# Patient Record
Sex: MALE | Race: WHITE | Employment: FULL TIME
[De-identification: names, ages, dates, MRNs, and addresses within clinical notes are randomized per-mention and may not be internally consistent; named-entity substitution may affect disease eponyms.]

---

## 2017-01-17 ENCOUNTER — OFFICE VISIT (OUTPATIENT)
Dept: FAMILY MEDICINE CLINIC | Facility: CLINIC | Age: 22
End: 2017-01-17

## 2017-01-17 VITALS
BODY MASS INDEX: 33.64 KG/M2 | DIASTOLIC BLOOD PRESSURE: 78 MMHG | HEART RATE: 89 BPM | OXYGEN SATURATION: 98 % | TEMPERATURE: 98.6 F | HEIGHT: 70 IN | SYSTOLIC BLOOD PRESSURE: 112 MMHG | WEIGHT: 235 LBS

## 2017-01-17 DIAGNOSIS — M54.6 CHRONIC THORACIC BACK PAIN, UNSPECIFIED BACK PAIN LATERALITY: Primary | ICD-10-CM

## 2017-01-17 DIAGNOSIS — Z23 NEED FOR PNEUMOCOCCAL VACCINATION: ICD-10-CM

## 2017-01-17 DIAGNOSIS — Z13.9 SCREENING: ICD-10-CM

## 2017-01-17 DIAGNOSIS — G89.29 CHRONIC PAIN OF RIGHT KNEE: ICD-10-CM

## 2017-01-17 DIAGNOSIS — Z72.0 TOBACCO ABUSE: ICD-10-CM

## 2017-01-17 DIAGNOSIS — Z23 NEED FOR TETANUS BOOSTER: ICD-10-CM

## 2017-01-17 DIAGNOSIS — G89.29 CHRONIC THORACIC BACK PAIN, UNSPECIFIED BACK PAIN LATERALITY: Primary | ICD-10-CM

## 2017-01-17 DIAGNOSIS — Z23 NEED FOR INFLUENZA VACCINATION: ICD-10-CM

## 2017-01-17 DIAGNOSIS — M25.561 CHRONIC PAIN OF RIGHT KNEE: ICD-10-CM

## 2017-01-17 PROBLEM — M54.50 CHRONIC LOW BACK PAIN: Status: ACTIVE | Noted: 2017-01-17

## 2017-01-17 PROCEDURE — 90471 IMMUNIZATION ADMIN: CPT | Performed by: PHYSICIAN ASSISTANT

## 2017-01-17 PROCEDURE — 99203 OFFICE O/P NEW LOW 30 MIN: CPT | Performed by: PHYSICIAN ASSISTANT

## 2017-01-17 PROCEDURE — 90472 IMMUNIZATION ADMIN EACH ADD: CPT | Performed by: PHYSICIAN ASSISTANT

## 2017-01-17 PROCEDURE — 90715 TDAP VACCINE 7 YRS/> IM: CPT | Performed by: PHYSICIAN ASSISTANT

## 2017-01-17 PROCEDURE — 90670 PCV13 VACCINE IM: CPT | Performed by: PHYSICIAN ASSISTANT

## 2017-01-17 PROCEDURE — 90688 IIV4 VACCINE SPLT 0.5 ML IM: CPT | Performed by: PHYSICIAN ASSISTANT

## 2017-01-17 RX ORDER — LIDOCAINE 50 MG/G
1 PATCH TOPICAL DAILY
Qty: 30 PATCH | Refills: 2 | Status: SHIPPED | OUTPATIENT
Start: 2017-01-17 | End: 2019-01-18 | Stop reason: ALTCHOICE

## 2017-01-17 RX ORDER — NICOTINE POLACRILEX 4 MG/1
20 GUM, CHEWING ORAL DAILY PRN
COMMUNITY
End: 2017-04-19 | Stop reason: SDUPTHER

## 2017-01-17 ASSESSMENT — PATIENT HEALTH QUESTIONNAIRE - PHQ9
1. LITTLE INTEREST OR PLEASURE IN DOING THINGS: 0
2. FEELING DOWN, DEPRESSED OR HOPELESS: 0
SUM OF ALL RESPONSES TO PHQ9 QUESTIONS 1 & 2: 0
SUM OF ALL RESPONSES TO PHQ QUESTIONS 1-9: 0

## 2017-01-17 ASSESSMENT — ENCOUNTER SYMPTOMS
DIARRHEA: 0
SINUS PRESSURE: 0
BACK PAIN: 1
BOWEL INCONTINENCE: 0
EYE DISCHARGE: 0
VOMITING: 0
SORE THROAT: 0
CHEST TIGHTNESS: 0
RHINORRHEA: 0
SHORTNESS OF BREATH: 0
COUGH: 0
ABDOMINAL PAIN: 0
EYE ITCHING: 0
NAUSEA: 0

## 2017-01-20 RX ORDER — BUPROPION HYDROCHLORIDE 75 MG/1
75 TABLET ORAL 3 TIMES DAILY
Qty: 60 TABLET | Refills: 3 | Status: CANCELLED | OUTPATIENT
Start: 2017-01-20

## 2017-01-20 RX ORDER — BUPROPION HYDROCHLORIDE 150 MG/1
150 TABLET, EXTENDED RELEASE ORAL 2 TIMES DAILY
Qty: 60 TABLET | Refills: 0 | Status: SHIPPED | OUTPATIENT
Start: 2017-01-20 | End: 2017-01-29

## 2017-01-30 ENCOUNTER — OFFICE VISIT (OUTPATIENT)
Dept: FAMILY MEDICINE CLINIC | Facility: CLINIC | Age: 22
End: 2017-01-30

## 2017-01-30 VITALS
HEIGHT: 70 IN | DIASTOLIC BLOOD PRESSURE: 80 MMHG | WEIGHT: 236.2 LBS | SYSTOLIC BLOOD PRESSURE: 130 MMHG | HEART RATE: 67 BPM | TEMPERATURE: 98.2 F | OXYGEN SATURATION: 98 % | BODY MASS INDEX: 33.82 KG/M2

## 2017-01-30 DIAGNOSIS — R55 SYNCOPE AND COLLAPSE: Primary | ICD-10-CM

## 2017-01-30 DIAGNOSIS — E55.9 VITAMIN D DEFICIENCY: ICD-10-CM

## 2017-01-30 DIAGNOSIS — R73.9 HYPERGLYCEMIA: ICD-10-CM

## 2017-01-30 LAB — HBA1C MFR BLD: 5 %

## 2017-01-30 PROCEDURE — 83036 HEMOGLOBIN GLYCOSYLATED A1C: CPT | Performed by: PHYSICIAN ASSISTANT

## 2017-01-30 PROCEDURE — 99214 OFFICE O/P EST MOD 30 MIN: CPT | Performed by: PHYSICIAN ASSISTANT

## 2017-01-30 RX ORDER — CHOLECALCIFEROL (VITAMIN D3) 1250 MCG
50000 CAPSULE ORAL WEEKLY
Qty: 12 CAPSULE | Refills: 0 | Status: SHIPPED | OUTPATIENT
Start: 2017-01-30 | End: 2017-04-04 | Stop reason: SDUPTHER

## 2017-01-30 ASSESSMENT — ENCOUNTER SYMPTOMS
VISUAL CHANGE: 1
COUGH: 0
SHORTNESS OF BREATH: 0
RHINORRHEA: 0
VOMITING: 0
BOWEL INCONTINENCE: 0
EYE DISCHARGE: 0
CHEST TIGHTNESS: 0
SORE THROAT: 0
SINUS PRESSURE: 0
ABDOMINAL PAIN: 0
EYE ITCHING: 0
BACK PAIN: 0
DIARRHEA: 0
NAUSEA: 0

## 2017-02-02 ENCOUNTER — TELEPHONE (OUTPATIENT)
Dept: FAMILY MEDICINE CLINIC | Facility: CLINIC | Age: 22
End: 2017-02-02

## 2017-02-07 ENCOUNTER — HOSPITAL ENCOUNTER (OUTPATIENT)
Dept: MRI IMAGING | Age: 22
Discharge: HOME | End: 2017-02-07
Payer: COMMERCIAL

## 2017-02-07 DIAGNOSIS — R55 SYNCOPE AND COLLAPSE: ICD-10-CM

## 2017-02-07 PROCEDURE — A9579 GAD-BASE MR CONTRAST NOS,1ML: HCPCS | Performed by: PHYSICIAN ASSISTANT

## 2017-02-07 PROCEDURE — 70553 MRI BRAIN STEM W/O & W/DYE: CPT

## 2017-02-07 PROCEDURE — 6360000004 HC RX CONTRAST MEDICATION: Performed by: PHYSICIAN ASSISTANT

## 2017-02-07 RX ADMIN — GADOPENTETATE DIMEGLUMINE 20 ML: 469.01 INJECTION INTRAVENOUS at 16:58

## 2017-02-24 ENCOUNTER — HOSPITAL ENCOUNTER (OUTPATIENT)
Dept: PAIN MANAGEMENT | Age: 22
Discharge: HOME | End: 2017-02-24
Payer: COMMERCIAL

## 2017-02-24 VITALS
RESPIRATION RATE: 16 BRPM | SYSTOLIC BLOOD PRESSURE: 126 MMHG | HEART RATE: 72 BPM | WEIGHT: 235 LBS | DIASTOLIC BLOOD PRESSURE: 61 MMHG | BODY MASS INDEX: 33.64 KG/M2 | HEIGHT: 70 IN | TEMPERATURE: 97.7 F | OXYGEN SATURATION: 96 %

## 2017-02-24 DIAGNOSIS — M51.34 DEGENERATIVE DISC DISEASE, THORACIC: Primary | ICD-10-CM

## 2017-02-24 DIAGNOSIS — M47.814 OSTEOARTHRITIS OF THORACIC SPINE, UNSPECIFIED SPINAL OSTEOARTHRITIS COMPLICATION STATUS: ICD-10-CM

## 2017-02-24 DIAGNOSIS — M47.814 ARTHROPATHY OF THORACIC FACET JOINT: ICD-10-CM

## 2017-02-24 PROCEDURE — 80307 DRUG TEST PRSMV CHEM ANLYZR: CPT

## 2017-02-24 PROCEDURE — 99213 OFFICE O/P EST LOW 20 MIN: CPT

## 2017-02-24 PROCEDURE — 99204 OFFICE O/P NEW MOD 45 MIN: CPT | Performed by: PAIN MEDICINE

## 2017-02-24 PROCEDURE — G0463 HOSPITAL OUTPT CLINIC VISIT: HCPCS

## 2017-02-24 RX ORDER — MECLIZINE HYDROCHLORIDE 25 MG/1
25 TABLET ORAL 3 TIMES DAILY PRN
COMMUNITY
End: 2017-07-05 | Stop reason: ALTCHOICE

## 2017-02-24 ASSESSMENT — PAIN DESCRIPTION - LOCATION: LOCATION: BACK

## 2017-02-24 ASSESSMENT — ENCOUNTER SYMPTOMS
HEARTBURN: 0
EYES NEGATIVE: 1
RESPIRATORY NEGATIVE: 1
DIARRHEA: 0
PHOTOPHOBIA: 0
BACK PAIN: 1
BLURRED VISION: 0
NAUSEA: 0
ABDOMINAL PAIN: 0
GASTROINTESTINAL NEGATIVE: 1

## 2017-02-24 ASSESSMENT — PAIN DESCRIPTION - PROGRESSION: CLINICAL_PROGRESSION: GRADUALLY WORSENING

## 2017-02-24 ASSESSMENT — PAIN SCALES - GENERAL: PAINLEVEL_OUTOF10: 7

## 2017-02-24 ASSESSMENT — PAIN DESCRIPTION - FREQUENCY: FREQUENCY: CONTINUOUS

## 2017-02-24 ASSESSMENT — PAIN DESCRIPTION - ONSET: ONSET: ON-GOING

## 2017-02-24 ASSESSMENT — PAIN DESCRIPTION - ORIENTATION: ORIENTATION: LOWER;MID

## 2017-02-24 ASSESSMENT — PAIN DESCRIPTION - DESCRIPTORS: DESCRIPTORS: ACHING;SHOOTING;CONSTANT

## 2017-02-24 ASSESSMENT — PAIN DESCRIPTION - PAIN TYPE: TYPE: CHRONIC PAIN

## 2017-03-01 LAB
6-ACETYLMORPHINE, UR: NOT DETECTED
7-AMINOCLONAZEPAM, URINE: NOT DETECTED
ALPHA-OH-ALPRAZ, URINE: NOT DETECTED
ALPRAZOLAM, URINE: NOT DETECTED
AMPHETAMINES, URINE: NOT DETECTED
BARBITURATES, URINE: NOT DETECTED
BENZOYLECGONINE, UR: NOT DETECTED
BUPRENORPHINE URINE: NOT DETECTED
CARISOPRODOL, UR: NOT DETECTED
CLONAZEPAM, URINE: NOT DETECTED
CODEINE, URINE: NOT DETECTED
CREATININE URINE: 243.3 MG/DL (ref 20–400)
DIAZEPAM, URINE: NOT DETECTED
DRUGS EXPECTED, UR: NORMAL
EER HI RES INTERP UR: NORMAL
ETHYL GLUCURONIDE UR: NOT DETECTED
FENTANYL URINE: NOT DETECTED
HYDROCODONE, URINE: NOT DETECTED
HYDROMORPHONE, URINE: NOT DETECTED
LORAZEPAM, URINE: NOT DETECTED
MARIJUANA METAB, UR: NOT DETECTED
MDA, UR: NOT DETECTED
MDEA, EVE, UR: NOT DETECTED
MDMA URINE: NOT DETECTED
MEPERIDINE METAB, UR: NOT DETECTED
METHADONE, URINE: NOT DETECTED
METHAMPHETAMINE, URINE: NOT DETECTED
METHYLPHENIDATE: NOT DETECTED
MIDAZOLAM, URINE: NOT DETECTED
MORPHINE URINE: NOT DETECTED
NORBUPRENORPHINE, URINE: NOT DETECTED
NORDIAZEPAM, URINE: NOT DETECTED
NORFENTANYL, URINE: NOT DETECTED
NORHYDROCODONE, URINE: NOT DETECTED
NOROXYCODONE, URINE: NOT DETECTED
NOROXYMORPHONE, URINE: NOT DETECTED
OXAZEPAM, URINE: NOT DETECTED
OXYCODONE URINE: NOT DETECTED
OXYMORPHONE, URINE: NOT DETECTED
PAIN MANAGEMENT DRUG PANEL INTERP, URINE: NORMAL
PAIN MGT DRUG PANEL, HI RES, UR: NORMAL
PCP,URINE: NOT DETECTED
PHENTERMINE, UR: NOT DETECTED
PROPOXYPHENE, URINE: NOT DETECTED
TAPENTADOL, URINE: NOT DETECTED
TAPENTADOL-O-SULFATE, URINE: NOT DETECTED
TEMAZEPAM, URINE: NOT DETECTED
TRAMADOL, URINE: NOT DETECTED
ZOLPIDEM, URINE: NOT DETECTED

## 2017-03-07 ENCOUNTER — HOSPITAL ENCOUNTER (OUTPATIENT)
Age: 22
Discharge: HOME | End: 2017-03-07
Payer: COMMERCIAL

## 2017-03-07 PROCEDURE — 36415 COLL VENOUS BLD VENIPUNCTURE: CPT

## 2017-03-07 PROCEDURE — 83655 ASSAY OF LEAD: CPT

## 2017-03-08 LAB — LEAD BLOOD: 1 UG/DL (ref 0–19)

## 2017-03-14 ENCOUNTER — HOSPITAL ENCOUNTER (OUTPATIENT)
Dept: GENERAL RADIOLOGY | Age: 22
Discharge: HOME | End: 2017-03-14
Payer: COMMERCIAL

## 2017-03-14 ENCOUNTER — HOSPITAL ENCOUNTER (OUTPATIENT)
Dept: PAIN MANAGEMENT | Age: 22
Discharge: HOME | End: 2017-03-14
Payer: COMMERCIAL

## 2017-03-14 VITALS
HEIGHT: 70 IN | TEMPERATURE: 98.6 F | HEART RATE: 90 BPM | OXYGEN SATURATION: 95 % | WEIGHT: 235 LBS | DIASTOLIC BLOOD PRESSURE: 84 MMHG | BODY MASS INDEX: 33.64 KG/M2 | RESPIRATION RATE: 17 BRPM | SYSTOLIC BLOOD PRESSURE: 142 MMHG

## 2017-03-14 DIAGNOSIS — M47.816 OSTEOARTHRITIS OF LUMBAR SPINE, UNSPECIFIED SPINAL OSTEOARTHRITIS COMPLICATION STATUS: ICD-10-CM

## 2017-03-14 DIAGNOSIS — M51.34 DEGENERATIVE DISC DISEASE, THORACIC: ICD-10-CM

## 2017-03-14 DIAGNOSIS — M47.814 ARTHROPATHY OF THORACIC FACET JOINT: ICD-10-CM

## 2017-03-14 DIAGNOSIS — M47.814 OSTEOARTHRITIS OF THORACIC SPINE, UNSPECIFIED SPINAL OSTEOARTHRITIS COMPLICATION STATUS: Primary | ICD-10-CM

## 2017-03-14 PROCEDURE — 6360000002 HC RX W HCPCS: Performed by: PAIN MEDICINE

## 2017-03-14 PROCEDURE — 64491 INJ PARAVERT F JNT C/T 2 LEV: CPT

## 2017-03-14 PROCEDURE — 64492 INJ PARAVERT F JNT C/T 3 LEV: CPT | Performed by: PAIN MEDICINE

## 2017-03-14 PROCEDURE — 64491 INJ PARAVERT F JNT C/T 2 LEV: CPT | Performed by: PAIN MEDICINE

## 2017-03-14 PROCEDURE — 64490 INJ PARAVERT F JNT C/T 1 LEV: CPT

## 2017-03-14 PROCEDURE — 64492 INJ PARAVERT F JNT C/T 3 LEV: CPT

## 2017-03-14 PROCEDURE — 64490 INJ PARAVERT F JNT C/T 1 LEV: CPT | Performed by: PAIN MEDICINE

## 2017-03-14 PROCEDURE — 3209999900 FLUORO FOR SURGICAL PROCEDURES

## 2017-03-14 RX ORDER — MIDAZOLAM HYDROCHLORIDE 1 MG/ML
INJECTION INTRAMUSCULAR; INTRAVENOUS
Status: COMPLETED | OUTPATIENT
Start: 2017-03-14 | End: 2017-03-14

## 2017-03-14 RX ADMIN — MIDAZOLAM 2 MG: 1 INJECTION INTRAMUSCULAR; INTRAVENOUS at 09:11

## 2017-03-14 ASSESSMENT — PAIN DESCRIPTION - ORIENTATION: ORIENTATION: RIGHT;LEFT;UPPER;LOWER

## 2017-03-14 ASSESSMENT — PAIN DESCRIPTION - DESCRIPTORS
DESCRIPTORS: ACHING;CONSTANT
DESCRIPTORS: ACHING

## 2017-03-14 ASSESSMENT — PAIN SCALES - GENERAL: PAINLEVEL_OUTOF10: 7

## 2017-03-14 ASSESSMENT — PAIN DESCRIPTION - PAIN TYPE: TYPE: CHRONIC PAIN

## 2017-03-14 ASSESSMENT — PAIN - FUNCTIONAL ASSESSMENT
PAIN_FUNCTIONAL_ASSESSMENT: 0-10
PAIN_FUNCTIONAL_ASSESSMENT: 0-10

## 2017-03-14 ASSESSMENT — PAIN DESCRIPTION - FREQUENCY: FREQUENCY: CONTINUOUS

## 2017-03-14 ASSESSMENT — PAIN DESCRIPTION - ONSET: ONSET: ON-GOING

## 2017-03-14 ASSESSMENT — PAIN DESCRIPTION - PROGRESSION: CLINICAL_PROGRESSION: GRADUALLY WORSENING

## 2017-03-14 ASSESSMENT — PAIN DESCRIPTION - LOCATION: LOCATION: BACK

## 2017-03-15 ENCOUNTER — TELEPHONE (OUTPATIENT)
Dept: PAIN MANAGEMENT | Age: 22
End: 2017-03-15

## 2017-04-04 ENCOUNTER — OFFICE VISIT (OUTPATIENT)
Dept: FAMILY MEDICINE CLINIC | Age: 22
End: 2017-04-04
Payer: COMMERCIAL

## 2017-04-04 VITALS
TEMPERATURE: 97.3 F | HEART RATE: 80 BPM | BODY MASS INDEX: 33.17 KG/M2 | OXYGEN SATURATION: 98 % | SYSTOLIC BLOOD PRESSURE: 130 MMHG | WEIGHT: 231.2 LBS | DIASTOLIC BLOOD PRESSURE: 80 MMHG

## 2017-04-04 DIAGNOSIS — E55.9 VITAMIN D DEFICIENCY: ICD-10-CM

## 2017-04-04 DIAGNOSIS — R55 SYNCOPE, UNSPECIFIED SYNCOPE TYPE: ICD-10-CM

## 2017-04-04 DIAGNOSIS — M51.34 DEGENERATIVE DISC DISEASE, THORACIC: ICD-10-CM

## 2017-04-04 DIAGNOSIS — R73.9 HYPERGLYCEMIA: ICD-10-CM

## 2017-04-04 DIAGNOSIS — M47.814 OSTEOARTHRITIS OF THORACIC SPINE, UNSPECIFIED SPINAL OSTEOARTHRITIS COMPLICATION STATUS: Primary | ICD-10-CM

## 2017-04-04 DIAGNOSIS — Z72.0 TOBACCO ABUSE: ICD-10-CM

## 2017-04-04 DIAGNOSIS — G89.29 CHRONIC LOW BACK PAIN, UNSPECIFIED BACK PAIN LATERALITY, WITH SCIATICA PRESENCE UNSPECIFIED: ICD-10-CM

## 2017-04-04 DIAGNOSIS — M54.5 CHRONIC LOW BACK PAIN, UNSPECIFIED BACK PAIN LATERALITY, WITH SCIATICA PRESENCE UNSPECIFIED: ICD-10-CM

## 2017-04-04 PROCEDURE — 99214 OFFICE O/P EST MOD 30 MIN: CPT | Performed by: PHYSICIAN ASSISTANT

## 2017-04-04 RX ORDER — CHOLECALCIFEROL (VITAMIN D3) 1250 MCG
50000 CAPSULE ORAL WEEKLY
Qty: 4 CAPSULE | Refills: 0 | Status: SHIPPED | OUTPATIENT
Start: 2017-04-04 | End: 2017-05-12 | Stop reason: SDUPTHER

## 2017-04-04 ASSESSMENT — ENCOUNTER SYMPTOMS
ABDOMINAL PAIN: 0
NAUSEA: 0
BOWEL INCONTINENCE: 0
CHEST TIGHTNESS: 0
BACK PAIN: 0
EYE DISCHARGE: 0
VOMITING: 0
EYE ITCHING: 0
RHINORRHEA: 0
DIARRHEA: 0
COUGH: 0
SINUS PRESSURE: 0
SHORTNESS OF BREATH: 0
VISUAL CHANGE: 1
SORE THROAT: 0

## 2017-04-19 RX ORDER — NICOTINE POLACRILEX 4 MG/1
20 GUM, CHEWING ORAL DAILY
Qty: 30 TABLET | Refills: 3 | Status: SHIPPED | OUTPATIENT
Start: 2017-04-19 | End: 2017-05-12

## 2017-05-12 ENCOUNTER — OFFICE VISIT (OUTPATIENT)
Dept: FAMILY MEDICINE CLINIC | Age: 22
End: 2017-05-12
Payer: COMMERCIAL

## 2017-05-12 VITALS
WEIGHT: 234.4 LBS | HEART RATE: 80 BPM | OXYGEN SATURATION: 98 % | DIASTOLIC BLOOD PRESSURE: 82 MMHG | HEIGHT: 70 IN | TEMPERATURE: 98.1 F | SYSTOLIC BLOOD PRESSURE: 128 MMHG | BODY MASS INDEX: 33.56 KG/M2

## 2017-05-12 DIAGNOSIS — G89.29 CHRONIC LOW BACK PAIN, UNSPECIFIED BACK PAIN LATERALITY, WITH SCIATICA PRESENCE UNSPECIFIED: ICD-10-CM

## 2017-05-12 DIAGNOSIS — E55.9 VITAMIN D DEFICIENCY: ICD-10-CM

## 2017-05-12 DIAGNOSIS — R73.9 HYPERGLYCEMIA: ICD-10-CM

## 2017-05-12 DIAGNOSIS — M54.5 CHRONIC LOW BACK PAIN, UNSPECIFIED BACK PAIN LATERALITY, WITH SCIATICA PRESENCE UNSPECIFIED: ICD-10-CM

## 2017-05-12 DIAGNOSIS — M51.34 DEGENERATIVE DISC DISEASE, THORACIC: ICD-10-CM

## 2017-05-12 DIAGNOSIS — Z72.0 TOBACCO ABUSE: Primary | ICD-10-CM

## 2017-05-12 DIAGNOSIS — M47.814 OSTEOARTHRITIS OF THORACIC SPINE, UNSPECIFIED SPINAL OSTEOARTHRITIS COMPLICATION STATUS: ICD-10-CM

## 2017-05-12 PROCEDURE — 99213 OFFICE O/P EST LOW 20 MIN: CPT | Performed by: PHYSICIAN ASSISTANT

## 2017-05-12 RX ORDER — CHOLECALCIFEROL (VITAMIN D3) 1250 MCG
50000 CAPSULE ORAL WEEKLY
Qty: 4 CAPSULE | Refills: 0 | Status: SHIPPED | OUTPATIENT
Start: 2017-05-12 | End: 2017-11-15 | Stop reason: ALTCHOICE

## 2017-05-12 RX ORDER — OMEPRAZOLE 40 MG/1
40 CAPSULE, DELAYED RELEASE ORAL DAILY
Qty: 30 CAPSULE | Refills: 3 | Status: SHIPPED | OUTPATIENT
Start: 2017-05-12 | End: 2017-07-05 | Stop reason: SDUPTHER

## 2017-05-12 ASSESSMENT — ENCOUNTER SYMPTOMS
BACK PAIN: 1
SORE THROAT: 0
COUGH: 0
DIARRHEA: 0
SINUS PRESSURE: 0
RHINORRHEA: 0
CHOKING: 0
GLOBUS SENSATION: 0
HEARTBURN: 0
EYE ITCHING: 0
EYE DISCHARGE: 0
STRIDOR: 0
CHEST TIGHTNESS: 0
NAUSEA: 0
HOARSE VOICE: 0
ABDOMINAL PAIN: 0
BELCHING: 0
SHORTNESS OF BREATH: 0

## 2017-05-30 ENCOUNTER — TELEPHONE (OUTPATIENT)
Dept: FAMILY MEDICINE CLINIC | Age: 22
End: 2017-05-30

## 2017-07-02 ENCOUNTER — APPOINTMENT (OUTPATIENT)
Dept: CT IMAGING | Age: 22
End: 2017-07-02
Payer: COMMERCIAL

## 2017-07-02 ENCOUNTER — HOSPITAL ENCOUNTER (EMERGENCY)
Age: 22
Discharge: HOME | End: 2017-07-02
Attending: EMERGENCY MEDICINE
Payer: COMMERCIAL

## 2017-07-02 VITALS
BODY MASS INDEX: 30.06 KG/M2 | OXYGEN SATURATION: 95 % | TEMPERATURE: 97.5 F | RESPIRATION RATE: 16 BRPM | DIASTOLIC BLOOD PRESSURE: 52 MMHG | HEART RATE: 88 BPM | WEIGHT: 210 LBS | SYSTOLIC BLOOD PRESSURE: 116 MMHG | HEIGHT: 70 IN

## 2017-07-02 DIAGNOSIS — D72.829 LEUKOCYTOSIS, UNSPECIFIED TYPE: ICD-10-CM

## 2017-07-02 DIAGNOSIS — R10.13 ABDOMINAL PAIN, EPIGASTRIC: Primary | ICD-10-CM

## 2017-07-02 DIAGNOSIS — K29.00 ACUTE SUPERFICIAL GASTRITIS WITHOUT HEMORRHAGE: ICD-10-CM

## 2017-07-02 LAB
ABSOLUTE BANDS #: 0.13 K/UL (ref 0–1)
ABSOLUTE EOS #: 0.13 K/UL (ref 0–0.4)
ABSOLUTE LYMPH #: 3.59 K/UL (ref 1–4.8)
ABSOLUTE MONO #: 1.33 K/UL (ref 0.1–1.3)
ALBUMIN SERPL-MCNC: 4.9 G/DL (ref 3.5–5.2)
ALBUMIN/GLOBULIN RATIO: NORMAL (ref 1–2.5)
ALP BLD-CCNC: 88 U/L (ref 40–129)
ALT SERPL-CCNC: 24 U/L (ref 5–41)
ANION GAP SERPL CALCULATED.3IONS-SCNC: 16 MMOL/L (ref 9–17)
AST SERPL-CCNC: 29 U/L
BANDS: 1 %
BASOPHILS # BLD: 0 %
BASOPHILS ABSOLUTE: 0 K/UL (ref 0–0.2)
BILIRUB SERPL-MCNC: 0.44 MG/DL (ref 0.3–1.2)
BILIRUBIN DIRECT: 0.14 MG/DL
BILIRUBIN, INDIRECT: 0.3 MG/DL (ref 0–1)
BUN BLDV-MCNC: 12 MG/DL (ref 6–20)
BUN/CREAT BLD: ABNORMAL (ref 9–20)
CALCIUM SERPL-MCNC: 9.4 MG/DL (ref 8.6–10.4)
CHLORIDE BLD-SCNC: 101 MMOL/L (ref 98–107)
CO2: 24 MMOL/L (ref 20–31)
CREAT SERPL-MCNC: 0.9 MG/DL (ref 0.7–1.2)
DIFFERENTIAL TYPE: ABNORMAL
EOSINOPHILS RELATIVE PERCENT: 1 %
ETHANOL PERCENT: 0.14 %
ETHANOL: 141 MG/DL
GFR AFRICAN AMERICAN: >60 ML/MIN
GFR NON-AFRICAN AMERICAN: >60 ML/MIN
GFR SERPL CREATININE-BSD FRML MDRD: ABNORMAL ML/MIN/{1.73_M2}
GFR SERPL CREATININE-BSD FRML MDRD: ABNORMAL ML/MIN/{1.73_M2}
GLOBULIN: NORMAL G/DL (ref 1.5–3.8)
GLUCOSE BLD-MCNC: 110 MG/DL (ref 70–99)
HCT VFR BLD CALC: 51.5 % (ref 41–53)
HEMOGLOBIN: 17.6 G/DL (ref 13.5–17.5)
LIPASE: 18 U/L (ref 13–60)
LYMPHOCYTES # BLD: 27 %
MCH RBC QN AUTO: 30.8 PG (ref 26–34)
MCHC RBC AUTO-ENTMCNC: 34.2 G/DL (ref 31–37)
MCV RBC AUTO: 90.1 FL (ref 80–100)
MONOCYTES # BLD: 10 %
MORPHOLOGY: NORMAL
PDW BLD-RTO: 13 % (ref 11.5–14.9)
PLATELET # BLD: 249 K/UL (ref 150–450)
PLATELET ESTIMATE: ABNORMAL
PMV BLD AUTO: 8.3 FL (ref 6–12)
POTASSIUM SERPL-SCNC: 4.2 MMOL/L (ref 3.7–5.3)
RBC # BLD: 5.72 M/UL (ref 4.5–5.9)
RBC # BLD: ABNORMAL 10*6/UL
SEG NEUTROPHILS: 61 %
SEGMENTED NEUTROPHILS ABSOLUTE COUNT: 8.12 K/UL (ref 1.3–9.1)
SODIUM BLD-SCNC: 141 MMOL/L (ref 135–144)
TOTAL PROTEIN: 8.3 G/DL (ref 6.4–8.3)
WBC # BLD: 13.3 K/UL (ref 3.5–11)
WBC # BLD: ABNORMAL 10*3/UL

## 2017-07-02 PROCEDURE — 6360000004 HC RX CONTRAST MEDICATION: Performed by: EMERGENCY MEDICINE

## 2017-07-02 PROCEDURE — 99285 EMERGENCY DEPT VISIT HI MDM: CPT

## 2017-07-02 PROCEDURE — 83690 ASSAY OF LIPASE: CPT

## 2017-07-02 PROCEDURE — 85025 COMPLETE CBC W/AUTO DIFF WBC: CPT

## 2017-07-02 PROCEDURE — 96374 THER/PROPH/DIAG INJ IV PUSH: CPT

## 2017-07-02 PROCEDURE — 2580000003 HC RX 258: Performed by: EMERGENCY MEDICINE

## 2017-07-02 PROCEDURE — 74177 CT ABD & PELVIS W/CONTRAST: CPT

## 2017-07-02 PROCEDURE — S0028 INJECTION, FAMOTIDINE, 20 MG: HCPCS | Performed by: EMERGENCY MEDICINE

## 2017-07-02 PROCEDURE — 80076 HEPATIC FUNCTION PANEL: CPT

## 2017-07-02 PROCEDURE — 36415 COLL VENOUS BLD VENIPUNCTURE: CPT

## 2017-07-02 PROCEDURE — G0480 DRUG TEST DEF 1-7 CLASSES: HCPCS

## 2017-07-02 PROCEDURE — 80048 BASIC METABOLIC PNL TOTAL CA: CPT

## 2017-07-02 PROCEDURE — 2500000003 HC RX 250 WO HCPCS: Performed by: EMERGENCY MEDICINE

## 2017-07-02 RX ORDER — ALUMINA, MAGNESIA, AND SIMETHICONE 2400; 2400; 240 MG/30ML; MG/30ML; MG/30ML
30 SUSPENSION ORAL EVERY 6 HOURS PRN
Qty: 1 BOTTLE | Refills: 0 | Status: SHIPPED | OUTPATIENT
Start: 2017-07-02 | End: 2018-08-15 | Stop reason: ALTCHOICE

## 2017-07-02 RX ORDER — 0.9 % SODIUM CHLORIDE 0.9 %
1000 INTRAVENOUS SOLUTION INTRAVENOUS ONCE
Status: COMPLETED | OUTPATIENT
Start: 2017-07-02 | End: 2017-07-02

## 2017-07-02 RX ORDER — 0.9 % SODIUM CHLORIDE 0.9 %
100 INTRAVENOUS SOLUTION INTRAVENOUS ONCE
Status: COMPLETED | OUTPATIENT
Start: 2017-07-02 | End: 2017-07-02

## 2017-07-02 RX ORDER — SODIUM CHLORIDE 0.9 % (FLUSH) 0.9 %
10 SYRINGE (ML) INJECTION PRN
Status: DISCONTINUED | OUTPATIENT
Start: 2017-07-02 | End: 2017-07-02 | Stop reason: HOSPADM

## 2017-07-02 RX ADMIN — IOVERSOL 130 ML: 741 INJECTION INTRA-ARTERIAL; INTRAVENOUS at 05:27

## 2017-07-02 RX ADMIN — FAMOTIDINE 20 MG: 10 INJECTION, SOLUTION INTRAVENOUS at 04:20

## 2017-07-02 RX ADMIN — SODIUM CHLORIDE 100 ML: 9 INJECTION, SOLUTION INTRAVENOUS at 05:27

## 2017-07-02 RX ADMIN — Medication 10 ML: at 05:27

## 2017-07-02 RX ADMIN — SODIUM CHLORIDE 1000 ML: 9 INJECTION, SOLUTION INTRAVENOUS at 04:20

## 2017-07-02 ASSESSMENT — ENCOUNTER SYMPTOMS
NAUSEA: 1
COUGH: 0
SORE THROAT: 0
SHORTNESS OF BREATH: 0
VOMITING: 1
EYE PAIN: 0
DIARRHEA: 0
ABDOMINAL PAIN: 1
BACK PAIN: 0

## 2017-07-02 ASSESSMENT — PAIN SCALES - GENERAL: PAINLEVEL_OUTOF10: 10

## 2017-07-02 ASSESSMENT — PAIN DESCRIPTION - LOCATION: LOCATION: ABDOMEN

## 2017-07-02 ASSESSMENT — PAIN DESCRIPTION - PAIN TYPE: TYPE: ACUTE PAIN

## 2017-07-05 ENCOUNTER — TELEPHONE (OUTPATIENT)
Dept: FAMILY MEDICINE CLINIC | Age: 22
End: 2017-07-05

## 2017-07-05 ENCOUNTER — HOSPITAL ENCOUNTER (OUTPATIENT)
Age: 22
Setting detail: SPECIMEN
Discharge: HOME | End: 2017-07-05
Payer: COMMERCIAL

## 2017-07-05 ENCOUNTER — OFFICE VISIT (OUTPATIENT)
Dept: FAMILY MEDICINE CLINIC | Age: 22
End: 2017-07-05
Payer: COMMERCIAL

## 2017-07-05 VITALS
HEART RATE: 77 BPM | HEIGHT: 70 IN | TEMPERATURE: 98.1 F | OXYGEN SATURATION: 98 % | SYSTOLIC BLOOD PRESSURE: 110 MMHG | WEIGHT: 228 LBS | DIASTOLIC BLOOD PRESSURE: 73 MMHG | BODY MASS INDEX: 32.64 KG/M2

## 2017-07-05 DIAGNOSIS — R19.7 DIARRHEA, UNSPECIFIED TYPE: ICD-10-CM

## 2017-07-05 DIAGNOSIS — R30.0 DYSURIA: ICD-10-CM

## 2017-07-05 DIAGNOSIS — R11.2 NAUSEA AND VOMITING, INTRACTABILITY OF VOMITING NOT SPECIFIED, UNSPECIFIED VOMITING TYPE: ICD-10-CM

## 2017-07-05 DIAGNOSIS — R10.13 EPIGASTRIC PAIN: ICD-10-CM

## 2017-07-05 DIAGNOSIS — R10.13 EPIGASTRIC PAIN: Primary | ICD-10-CM

## 2017-07-05 LAB
ABSOLUTE EOS #: 0.31 K/UL (ref 0–0.4)
ABSOLUTE LYMPH #: 1.85 K/UL (ref 1–4.8)
ABSOLUTE MONO #: 0.93 K/UL (ref 0.1–0.8)
ALBUMIN SERPL-MCNC: 4.4 G/DL (ref 3.5–5.2)
ALBUMIN/GLOBULIN RATIO: 1.3 (ref 1–2.5)
ALP BLD-CCNC: 90 U/L (ref 40–129)
ALT SERPL-CCNC: 25 U/L (ref 5–41)
AMYLASE: 44 U/L (ref 28–100)
ANION GAP SERPL CALCULATED.3IONS-SCNC: 11 MMOL/L (ref 9–17)
AST SERPL-CCNC: 28 U/L
BASOPHILS # BLD: 1 %
BASOPHILS ABSOLUTE: 0.1 K/UL (ref 0–0.2)
BILIRUB SERPL-MCNC: 0.64 MG/DL (ref 0.3–1.2)
BILIRUBIN, POC: NORMAL
BLOOD URINE, POC: NORMAL
BUN BLDV-MCNC: 12 MG/DL (ref 6–20)
BUN/CREAT BLD: NORMAL (ref 9–20)
CALCIUM SERPL-MCNC: 8.9 MG/DL (ref 8.6–10.4)
CHLORIDE BLD-SCNC: 99 MMOL/L (ref 98–107)
CLARITY, POC: CLEAR
CO2: 26 MMOL/L (ref 20–31)
COLOR, POC: YELLOW
CREAT SERPL-MCNC: 0.76 MG/DL (ref 0.7–1.2)
CULTURE: NORMAL
CULTURE: NORMAL
DIFFERENTIAL TYPE: ABNORMAL
EOSINOPHILS RELATIVE PERCENT: 3 %
GFR AFRICAN AMERICAN: >60 ML/MIN
GFR NON-AFRICAN AMERICAN: >60 ML/MIN
GFR SERPL CREATININE-BSD FRML MDRD: NORMAL ML/MIN/{1.73_M2}
GFR SERPL CREATININE-BSD FRML MDRD: NORMAL ML/MIN/{1.73_M2}
GLUCOSE BLD-MCNC: 70 MG/DL (ref 70–99)
GLUCOSE URINE, POC: NORMAL
HCT VFR BLD CALC: 50.4 % (ref 41–53)
HEMOGLOBIN: 17.2 G/DL (ref 13.5–17.5)
KETONES, POC: NORMAL
LEUKOCYTE EST, POC: NORMAL
LIPASE: 18 U/L (ref 13–60)
LYMPHOCYTES # BLD: 18 %
Lab: NORMAL
MCH RBC QN AUTO: 30.6 PG (ref 26–34)
MCHC RBC AUTO-ENTMCNC: 34.1 G/DL (ref 31–37)
MCV RBC AUTO: 89.8 FL (ref 80–100)
MONOCYTES # BLD: 9 %
MORPHOLOGY: NORMAL
NITRITE, POC: NORMAL
PDW BLD-RTO: 13.3 % (ref 12.5–15.4)
PH, POC: 7
PLATELET # BLD: 45 K/UL (ref 140–450)
PLATELET ESTIMATE: ABNORMAL
PMV BLD AUTO: 9.9 FL (ref 6–12)
POTASSIUM SERPL-SCNC: 5 MMOL/L (ref 3.7–5.3)
PROTEIN, POC: NORMAL
RBC # BLD: 5.61 M/UL (ref 4.5–5.9)
RBC # BLD: ABNORMAL 10*6/UL
SEG NEUTROPHILS: 69 %
SEGMENTED NEUTROPHILS ABSOLUTE COUNT: 7.11 K/UL (ref 1.8–7.7)
SODIUM BLD-SCNC: 136 MMOL/L (ref 135–144)
SPECIFIC GRAVITY, POC: 1.02
SPECIMEN DESCRIPTION: NORMAL
STATUS: NORMAL
TOTAL PROTEIN: 7.7 G/DL (ref 6.4–8.3)
UROBILINOGEN, POC: 0.2
WBC # BLD: 10.3 K/UL (ref 3.5–11)
WBC # BLD: ABNORMAL 10*3/UL

## 2017-07-05 PROCEDURE — 81003 URINALYSIS AUTO W/O SCOPE: CPT | Performed by: PHYSICIAN ASSISTANT

## 2017-07-05 PROCEDURE — 99213 OFFICE O/P EST LOW 20 MIN: CPT | Performed by: PHYSICIAN ASSISTANT

## 2017-07-05 RX ORDER — ONDANSETRON 4 MG/1
4 TABLET, ORALLY DISINTEGRATING ORAL EVERY 8 HOURS PRN
Qty: 20 TABLET | Refills: 0 | Status: SHIPPED | OUTPATIENT
Start: 2017-07-05 | End: 2018-08-15 | Stop reason: ALTCHOICE

## 2017-07-05 RX ORDER — OMEPRAZOLE 40 MG/1
40 CAPSULE, DELAYED RELEASE ORAL DAILY
Qty: 30 CAPSULE | Refills: 3 | Status: SHIPPED | OUTPATIENT
Start: 2017-07-05 | End: 2017-08-16

## 2017-07-05 RX ORDER — SUCRALFATE ORAL 1 G/10ML
1 SUSPENSION ORAL 4 TIMES DAILY
Qty: 1200 ML | Refills: 0 | Status: SHIPPED | OUTPATIENT
Start: 2017-07-05 | End: 2018-08-15 | Stop reason: ALTCHOICE

## 2017-07-05 RX ORDER — FAMOTIDINE 40 MG/1
40 TABLET, FILM COATED ORAL EVERY EVENING
Qty: 30 TABLET | Refills: 3 | Status: SHIPPED | OUTPATIENT
Start: 2017-07-05 | End: 2017-08-16

## 2017-07-05 ASSESSMENT — ENCOUNTER SYMPTOMS
SHORTNESS OF BREATH: 0
COUGH: 0
CHEST TIGHTNESS: 0
HEARTBURN: 0
GLOBUS SENSATION: 0
BLOATING: 0
ABDOMINAL PAIN: 1
SINUS PRESSURE: 0
VOMITING: 1
EYE DISCHARGE: 0
HOARSE VOICE: 0
STRIDOR: 0
BACK PAIN: 1
CHOKING: 0
DIARRHEA: 1
NAUSEA: 1
SORE THROAT: 0
EYE ITCHING: 0
RHINORRHEA: 0
BELCHING: 0

## 2017-07-06 LAB
C. TRACHOMATIS DNA ,URINE: NEGATIVE
CAMPYLOBACTER PCR: NORMAL
CULTURE: NO GROWTH
CULTURE: NORMAL
Lab: NORMAL
N. GONORRHOEAE DNA, URINE: NEGATIVE
SALMONELLA PCR: NORMAL
SHIGATOXIN GENE PCR: NORMAL
SHIGELLA SP PCR: NORMAL
SPECIMEN DESCRIPTION: NORMAL
SPECIMEN: NORMAL
STATUS: NORMAL

## 2017-07-28 ENCOUNTER — OFFICE VISIT (OUTPATIENT)
Dept: FAMILY MEDICINE CLINIC | Age: 22
End: 2017-07-28
Payer: COMMERCIAL

## 2017-07-28 VITALS
SYSTOLIC BLOOD PRESSURE: 118 MMHG | DIASTOLIC BLOOD PRESSURE: 78 MMHG | TEMPERATURE: 97.3 F | HEIGHT: 70 IN | WEIGHT: 228.6 LBS | BODY MASS INDEX: 32.73 KG/M2 | HEART RATE: 72 BPM | OXYGEN SATURATION: 98 %

## 2017-07-28 DIAGNOSIS — L23.9 ALLERGIC DERMATITIS: Primary | ICD-10-CM

## 2017-07-28 PROCEDURE — 99213 OFFICE O/P EST LOW 20 MIN: CPT | Performed by: NURSE PRACTITIONER

## 2017-07-28 RX ORDER — LORATADINE 10 MG/1
10 TABLET ORAL DAILY
Qty: 30 TABLET | Refills: 1 | Status: SHIPPED | OUTPATIENT
Start: 2017-07-28 | End: 2018-02-02 | Stop reason: SDUPTHER

## 2017-07-28 RX ORDER — CLOBETASOL PROPIONATE 0.5 MG/G
CREAM TOPICAL
Qty: 1 TUBE | Refills: 1 | Status: SHIPPED | OUTPATIENT
Start: 2017-07-28 | End: 2018-08-15 | Stop reason: ALTCHOICE

## 2017-07-28 ASSESSMENT — ENCOUNTER SYMPTOMS
WHEEZING: 0
SHORTNESS OF BREATH: 0
EYE DISCHARGE: 0
COUGH: 0

## 2017-08-16 ENCOUNTER — OFFICE VISIT (OUTPATIENT)
Dept: FAMILY MEDICINE CLINIC | Age: 22
End: 2017-08-16
Payer: COMMERCIAL

## 2017-08-16 VITALS
BODY MASS INDEX: 32.58 KG/M2 | WEIGHT: 227.6 LBS | SYSTOLIC BLOOD PRESSURE: 124 MMHG | TEMPERATURE: 97.4 F | DIASTOLIC BLOOD PRESSURE: 80 MMHG | HEART RATE: 81 BPM | HEIGHT: 70 IN | OXYGEN SATURATION: 98 %

## 2017-08-16 DIAGNOSIS — Z23 NEED FOR INFLUENZA VACCINATION: ICD-10-CM

## 2017-08-16 DIAGNOSIS — L73.9 FOLLICULITIS: ICD-10-CM

## 2017-08-16 DIAGNOSIS — B07.9 VIRAL WARTS, UNSPECIFIED TYPE: ICD-10-CM

## 2017-08-16 DIAGNOSIS — R10.9 ABDOMINAL PAIN, UNSPECIFIED LOCATION: Primary | ICD-10-CM

## 2017-08-16 DIAGNOSIS — G89.29 CHRONIC LOW BACK PAIN, UNSPECIFIED BACK PAIN LATERALITY, WITH SCIATICA PRESENCE UNSPECIFIED: ICD-10-CM

## 2017-08-16 DIAGNOSIS — E55.9 VITAMIN D DEFICIENCY: ICD-10-CM

## 2017-08-16 DIAGNOSIS — R73.9 HYPERGLYCEMIA: ICD-10-CM

## 2017-08-16 DIAGNOSIS — M54.5 CHRONIC LOW BACK PAIN, UNSPECIFIED BACK PAIN LATERALITY, WITH SCIATICA PRESENCE UNSPECIFIED: ICD-10-CM

## 2017-08-16 DIAGNOSIS — Z72.0 TOBACCO ABUSE: ICD-10-CM

## 2017-08-16 PROCEDURE — 90471 IMMUNIZATION ADMIN: CPT | Performed by: PHYSICIAN ASSISTANT

## 2017-08-16 PROCEDURE — 90654 INFLUENZA, 18-64 YRS, INTRADERMAL, PF (FLUZONE PF ID): CPT | Performed by: PHYSICIAN ASSISTANT

## 2017-08-16 PROCEDURE — 99214 OFFICE O/P EST MOD 30 MIN: CPT | Performed by: PHYSICIAN ASSISTANT

## 2017-08-16 RX ORDER — OMEPRAZOLE 20 MG/1
20 CAPSULE, DELAYED RELEASE ORAL DAILY
Qty: 30 CAPSULE | Refills: 3 | Status: SHIPPED | OUTPATIENT
Start: 2017-08-16 | End: 2017-11-15

## 2017-08-16 RX ORDER — CEPHALEXIN 500 MG/1
500 CAPSULE ORAL 3 TIMES DAILY
Qty: 30 CAPSULE | Refills: 0 | Status: SHIPPED | OUTPATIENT
Start: 2017-08-16 | End: 2017-08-26

## 2017-08-16 RX ORDER — NICOTINE 21 MG/24HR
1 PATCH, TRANSDERMAL 24 HOURS TRANSDERMAL EVERY 24 HOURS
Qty: 30 PATCH | Refills: 0 | Status: SHIPPED | OUTPATIENT
Start: 2017-08-16 | End: 2017-11-15

## 2017-08-16 ASSESSMENT — ENCOUNTER SYMPTOMS
CONSTIPATION: 0
BOWEL INCONTINENCE: 0
STRIDOR: 0
RHINORRHEA: 0
BACK PAIN: 1
EYE PAIN: 0
SORE THROAT: 0
SINUS PRESSURE: 0
DIARRHEA: 1
HOARSE VOICE: 0
CHOKING: 0
ABDOMINAL PAIN: 1
CHEST TIGHTNESS: 0
EYE ITCHING: 0
HEARTBURN: 0
NAUSEA: 1
COUGH: 0
EYE DISCHARGE: 0
VOMITING: 1
GLOBUS SENSATION: 0
BELCHING: 0
BLOATING: 0
SHORTNESS OF BREATH: 0
SWOLLEN GLANDS: 0

## 2017-10-16 ENCOUNTER — TELEPHONE (OUTPATIENT)
Dept: FAMILY MEDICINE CLINIC | Age: 22
End: 2017-10-16

## 2017-10-16 ENCOUNTER — OFFICE VISIT (OUTPATIENT)
Dept: FAMILY MEDICINE CLINIC | Age: 22
End: 2017-10-16
Payer: COMMERCIAL

## 2017-10-16 VITALS
WEIGHT: 223 LBS | HEIGHT: 70 IN | OXYGEN SATURATION: 98 % | TEMPERATURE: 98.7 F | HEART RATE: 76 BPM | SYSTOLIC BLOOD PRESSURE: 101 MMHG | DIASTOLIC BLOOD PRESSURE: 82 MMHG | BODY MASS INDEX: 31.92 KG/M2

## 2017-10-16 DIAGNOSIS — R73.9 HYPERGLYCEMIA: ICD-10-CM

## 2017-10-16 DIAGNOSIS — R06.83 SNORING: ICD-10-CM

## 2017-10-16 DIAGNOSIS — Z72.0 TOBACCO ABUSE: ICD-10-CM

## 2017-10-16 DIAGNOSIS — R55 NEAR SYNCOPE: Primary | ICD-10-CM

## 2017-10-16 DIAGNOSIS — R00.2 PALPITATION: ICD-10-CM

## 2017-10-16 DIAGNOSIS — E55.9 VITAMIN D DEFICIENCY: ICD-10-CM

## 2017-10-16 DIAGNOSIS — R06.81 APNEIC: ICD-10-CM

## 2017-10-16 PROCEDURE — 99214 OFFICE O/P EST MOD 30 MIN: CPT | Performed by: PHYSICIAN ASSISTANT

## 2017-10-16 ASSESSMENT — ENCOUNTER SYMPTOMS
NAUSEA: 0
BOWEL INCONTINENCE: 0
BACK PAIN: 0
VOMITING: 0
ABDOMINAL PAIN: 0
VISUAL CHANGE: 0

## 2017-10-16 NOTE — PROGRESS NOTES
[facility] [id number]  [address] [place] [id number]  Dept: [phone]  Dept Fax: [phone]    Ayan Chawla is a 20 y.o. male who presents today for his medical conditions/complaints as noted below. Ayan Chawla is c/o of   Chief Complaint   Patient presents with    Dizziness    Fatigue         HPI:     Loss of Consciousness   This is a recurrent problem. The current episode started yesterday. Associated symptoms include dizziness, light-headedness, malaise/fatigue and palpitations. Pertinent negatives include no abdominal pain, auditory change, aura, back pain, bladder incontinence, bowel incontinence, chest pain, clumsiness, confusion, diaphoresis, fever, focal sensory loss, focal weakness, headaches, nausea, slurred speech, vertigo, visual change, vomiting or weakness.        Hemoglobin A1C (%)   Date Value   01/30/2017 5.0             ( goal A1C is < 7)   No results found for: LABMICR  LDL Cholesterol (mg/dL)   Date Value   01/24/2017 128       (goal LDL is <100)   AST (U/L)   Date Value   07/05/2017 28     ALT (U/L)   Date Value   07/05/2017 25     BUN (mg/dL)   Date Value   07/05/2017 12     BP Readings from Last 3 Encounters:   10/16/17 101/82   08/16/17 124/80   07/28/17 118/78          (goal 120/80)    Past Medical History:   Diagnosis Date    Arthritis     Asthma     Back pain     Blood platelet disorder (HCC)     GERD (gastroesophageal reflux disease)     Syncope       Past Surgical History:   Procedure Laterality Date    HERNIA REPAIR         Family History   Problem Relation Age of Onset    No Known Problems Mother     Other Father     Asthma Brother     Other Brother        Social History   Substance Use Topics    Smoking status: Current Every Day Smoker     Packs/day: 0.50     Years: 1.00     Types: Cigarettes    Smokeless tobacco: Never Used    Alcohol use Yes      Comment: occassion      Current Outpatient Prescriptions   Medication Sig Genitourinary: Negative for bladder incontinence. Musculoskeletal: Negative for back pain. Neurological: Positive for dizziness and light-headedness. Negative for vertigo, focal weakness, weakness and headaches. Psychiatric/Behavioral: Negative for confusion. Objective:     Physical Exam  /82   Pulse 76   Temp 98.7 °F (37.1 °C) (Oral)   Ht 5' 10\" (1.778 m)   Wt 223 lb (101.2 kg)   SpO2 98%   BMI 32.00 kg/m²     Assessment:      1. Near syncope  Cardiac event monitor   2. Vitamin D deficiency     3. Hyperglycemia     4. Tobacco abuse     5. Snoring  Amb External Referral To Sleep Medicine   6. Apneic  Amb External Referral To Sleep Medicine   7. Palpitation  Cardiac event monitor             Plan:      No Follow-up on file. Orders Placed This Encounter   Procedures    Amb External Referral To Sleep Medicine     Referral Priority:   Routine     Referral Type:   Consult for Advice and Opinion     Referral Reason:   Specialty Services Required     Referred to Provider:   Lupe Giang MD     Requested Specialty:   Psychiatry     Number of Visits Requested:   1    Cardiac event monitor     Standing Status:   Future     Standing Expiration Date:   10/16/2018     Scheduling Instructions:      30 day event cardiac monitoring     No orders of the defined types were placed in this encounter. ER visit for syncope 1/29/17 reviewed. Pt states wasn't feeling well. Then unconscious for 20 min. Pt states only new thing was wellbutrin. Has been taking it for a few days. Pt did not notice any sx once starting it. Labs and CT reviewed. Pt states he did see Dr. Holly Lemus approx 2016 and had a normal cardiac work up. Will obtain records. Tilt table rx. MRI and neuro referral.  Saw neuro - Dr. Neri Roberts in [place]. Had EEG - pending. ECHO 3/17/17 nl. Also had tilt table test.      Depression - Pt states bullied in HS. Considered suicide freshman year HS.  Pt states no issues since.     Back pain - Chronic. T9 and T10 bulging disc per pt. States old workmans comp case. Saw Dr. Montana Turcios. Told no surgery. Pain management recommended it at that time. Referral generated. MRI 6/26/16 showed minimal degenerative changes with no evidence of acute fracture or other abnormality.       Knee pain - Right. Pain medially. Xray and PT     Tobacco abuse - Pt encouraged to stop. Understands risk. Smoking 1 ppd. Xray ordered. Poss adverse rxn to wellbutrin     Balanced diet and routine exercise encouraged.     Multivitamin with vitamin D daily encouraged.     Good sleep hygiene encouraged.     Dental exam q 6 mo.     Vision yearly.     Sunscreen encouraged.     Immunizations reviewed.     Tdap - 1/17/17     Flu - 1/17/17     Prevnar 13 - 1/17/17     Elige Burn disease - Dad. Pt states he actually has blood pool def. Patient given educational materials - see patient instructions. Discussed use, benefit, and side effects of prescribed medications. All patient questions answered. Pt voiced understanding. Reviewed health maintenance. Instructed to continue current medications, diet and exercise. Patient agreed with treatment plan. Follow up as directed.      Electronically signed by Cadence Tan PA-C on 10/16/2017 at 3:06 PM

## 2017-11-08 ENCOUNTER — TELEPHONE (OUTPATIENT)
Dept: FAMILY MEDICINE CLINIC | Age: 22
End: 2017-11-08

## 2017-11-15 ENCOUNTER — TELEPHONE (OUTPATIENT)
Dept: FAMILY MEDICINE CLINIC | Age: 22
End: 2017-11-15

## 2017-11-15 ENCOUNTER — OFFICE VISIT (OUTPATIENT)
Dept: FAMILY MEDICINE CLINIC | Age: 22
End: 2017-11-15
Payer: COMMERCIAL

## 2017-11-15 VITALS
HEART RATE: 91 BPM | DIASTOLIC BLOOD PRESSURE: 78 MMHG | SYSTOLIC BLOOD PRESSURE: 132 MMHG | OXYGEN SATURATION: 97 % | WEIGHT: 220.4 LBS | BODY MASS INDEX: 31.55 KG/M2 | HEIGHT: 70 IN | TEMPERATURE: 97.8 F

## 2017-11-15 DIAGNOSIS — J40 BRONCHITIS: Primary | ICD-10-CM

## 2017-11-15 DIAGNOSIS — Z72.0 TOBACCO ABUSE: Primary | ICD-10-CM

## 2017-11-15 DIAGNOSIS — Z72.0 TOBACCO ABUSE: ICD-10-CM

## 2017-11-15 PROCEDURE — 99213 OFFICE O/P EST LOW 20 MIN: CPT | Performed by: PHYSICIAN ASSISTANT

## 2017-11-15 RX ORDER — AMOXICILLIN AND CLAVULANATE POTASSIUM 875; 125 MG/1; MG/1
1 TABLET, FILM COATED ORAL 2 TIMES DAILY
Qty: 20 TABLET | Refills: 0 | Status: SHIPPED | OUTPATIENT
Start: 2017-11-15 | End: 2017-11-25

## 2017-11-15 RX ORDER — TRIAMCINOLONE ACETONIDE 40 MG/ML
40 INJECTION, SUSPENSION INTRA-ARTICULAR; INTRAMUSCULAR ONCE
Status: COMPLETED | OUTPATIENT
Start: 2017-11-15 | End: 2017-11-15

## 2017-11-15 RX ORDER — ALBUTEROL SULFATE 90 UG/1
2 AEROSOL, METERED RESPIRATORY (INHALATION) EVERY 6 HOURS PRN
Qty: 1 INHALER | Refills: 3 | Status: SHIPPED | OUTPATIENT
Start: 2017-11-15 | End: 2022-04-07

## 2017-11-15 RX ORDER — BUPROPION HYDROCHLORIDE 150 MG/1
150 TABLET, EXTENDED RELEASE ORAL 2 TIMES DAILY
Qty: 60 TABLET | Refills: 0 | Status: SHIPPED | OUTPATIENT
Start: 2017-11-15 | End: 2017-12-20 | Stop reason: SDUPTHER

## 2017-11-15 RX ORDER — OMEPRAZOLE 40 MG/1
CAPSULE, DELAYED RELEASE ORAL
COMMUNITY
Start: 2017-11-13 | End: 2018-08-15 | Stop reason: ALTCHOICE

## 2017-11-15 RX ORDER — FAMOTIDINE 40 MG/1
TABLET, FILM COATED ORAL
COMMUNITY
Start: 2017-11-13 | End: 2018-08-15 | Stop reason: ALTCHOICE

## 2017-11-15 RX ADMIN — TRIAMCINOLONE ACETONIDE 40 MG: 40 INJECTION, SUSPENSION INTRA-ARTICULAR; INTRAMUSCULAR at 14:41

## 2017-11-15 ASSESSMENT — ENCOUNTER SYMPTOMS
SHORTNESS OF BREATH: 0
RHINORRHEA: 1
CHEST TIGHTNESS: 0
SINUS PRESSURE: 0
EYE ITCHING: 0
BACK PAIN: 0
ABDOMINAL PAIN: 0
HEMOPTYSIS: 0
DIARRHEA: 0
VOMITING: 0
SORE THROAT: 0
EYE DISCHARGE: 0
NAUSEA: 0
WHEEZING: 1
VISUAL CHANGE: 0
COUGH: 1
BOWEL INCONTINENCE: 0
HEARTBURN: 0

## 2017-11-15 ASSESSMENT — PATIENT HEALTH QUESTIONNAIRE - PHQ9
SUM OF ALL RESPONSES TO PHQ QUESTIONS 1-9: 0
2. FEELING DOWN, DEPRESSED OR HOPELESS: 0
1. LITTLE INTEREST OR PLEASURE IN DOING THINGS: 0
SUM OF ALL RESPONSES TO PHQ9 QUESTIONS 1 & 2: 0

## 2017-11-15 NOTE — PROGRESS NOTES
(KENALOG-40) injection 40 mg  40 mg Intramuscular Once Ferny Marcum PA-C         Allergies   Allergen Reactions    Seasonal        Health Maintenance   Topic Date Due    HIV screen  08/17/2009    Pneumococcal med risk (1 of 1 - PPSV23) 08/17/2013    DTaP/Tdap/Td vaccine (2 - Td) 01/17/2027    Flu vaccine  Completed       Subjective:      Review of Systems   Constitutional: Positive for chills and malaise/fatigue. Negative for diaphoresis, fatigue, fever and weight loss. HENT: Positive for postnasal drip and rhinorrhea. Negative for congestion, ear discharge, ear pain, sinus pressure and sore throat. Eyes: Negative for discharge and itching. Respiratory: Positive for cough and wheezing. Negative for hemoptysis, chest tightness and shortness of breath. Cardiovascular: Positive for palpitations and syncope. Negative for chest pain and leg swelling. Gastrointestinal: Negative for abdominal pain, bowel incontinence, diarrhea, heartburn, nausea and vomiting. Genitourinary: Negative for bladder incontinence, dysuria and frequency. Musculoskeletal: Negative for back pain, myalgias, neck pain and neck stiffness. Skin: Negative for rash. Neurological: Positive for dizziness and light-headedness. Negative for vertigo, focal weakness, weakness, numbness and headaches. Psychiatric/Behavioral: Negative for confusion. All other systems reviewed and are negative. Objective:     Physical Exam   Constitutional: He is oriented to person, place, and time. He appears well-developed and well-nourished. No distress. /78   Pulse 91   Temp 97.8 °F (36.6 °C) (Oral)   Ht 5' 10\" (1.778 m)   Wt 220 lb 6.4 oz (100 kg)   SpO2 97%   BMI 31.62 kg/m²      HENT:   Head: Normocephalic and atraumatic.    Right Ear: External ear normal.   Left Ear: External ear normal.   Nose: Nose normal.   Mouth/Throat: Oropharynx is clear and moist.   Eyes: Conjunctivae and EOM are normal. Pupils are equal, round, and reactive to light. Right eye exhibits no discharge. Left eye exhibits no discharge. No scleral icterus. Neck: Normal range of motion. Neck supple. No tracheal deviation present. No thyromegaly present. Cardiovascular: Normal rate, regular rhythm and normal heart sounds. Exam reveals no gallop and no friction rub. No murmur heard. Pulmonary/Chest: Effort normal and breath sounds normal. No stridor. No respiratory distress. He has no wheezes. He has no rales. He exhibits no tenderness. Rhonchi   Abdominal: Soft. Bowel sounds are normal. He exhibits no distension. There is no tenderness. There is no rebound and no guarding. Musculoskeletal: He exhibits no edema. Neurological: He is alert and oriented to person, place, and time. Gait normal.   Skin: Skin is warm and dry. No rash noted. He is not diaphoretic. Psychiatric: He has a normal mood and affect. His affect is not inappropriate. Nursing note and vitals reviewed. /78   Pulse 91   Temp 97.8 °F (36.6 °C) (Oral)   Ht 5' 10\" (1.778 m)   Wt 220 lb 6.4 oz (100 kg)   SpO2 97%   BMI 31.62 kg/m²     Assessment:      1. Bronchitis     2. Tobacco abuse               Plan:      No Follow-up on file. No orders of the defined types were placed in this encounter. Orders Placed This Encounter   Medications    amoxicillin-clavulanate (AUGMENTIN) 875-125 MG per tablet     Sig: Take 1 tablet by mouth 2 times daily for 10 days     Dispense:  20 tablet     Refill:  0    triamcinolone acetonide (KENALOG-40) injection 40 mg    albuterol sulfate HFA (PROAIR HFA) 108 (90 Base) MCG/ACT inhaler     Sig: Inhale 2 puffs into the lungs every 6 hours as needed for Wheezing     Dispense:  1 Inhaler     Refill:  3    ER visit for syncope 1/29/17 reviewed. Pt states wasn't feeling well. Then unconscious for 20 min. Pt states only new thing was wellbutrin. Has been taking it for a few days. Pt did not notice any sx once starting it.   Labs and CT reviewed. Pt states he did see Dr. Leatha Holcomb approx 2016 and had a normal cardiac work up. Will obtain records. Tilt table rx. MRI and neuro referral.  Saw neuro - Dr. Olsen Never in [place]. Had EEG - pending. ECHO 3/17/17 nl. Also had tilt table test.      Depression - Pt states bullied in HS. Considered suicide freshman year HS. Pt states no issues since.     Back pain - Chronic. T9 and T10 bulging disc per pt. States old workmans comp case. Saw Dr. Veronica Olvera. Told no surgery. Pain management recommended it at that time. Referral generated. MRI 6/26/16 showed minimal degenerative changes with no evidence of acute fracture or other abnormality.       Knee pain - Right. Pain medially. Xray and PT     Tobacco abuse - Pt encouraged to stop. Understands risk. Smoking 1 ppd. Xray ordered. Poss adverse rxn to wellbutrin     Balanced diet and routine exercise encouraged.     Multivitamin with vitamin D daily encouraged.     Good sleep hygiene encouraged.     Dental exam q 6 mo.     Vision yearly.     Sunscreen encouraged.     Immunizations reviewed.     Tdap - 1/17/17     Flu - 1/17/17     Prevnar 13 - 1/17/17     Rodney Abts disease - Dad. Pt states he actually has blood pool def. Patient given educational materials - see patient instructions. Discussed use, benefit, and side effects of prescribed medications. All patient questions answered. Pt voiced understanding. Reviewed health maintenance. Instructed to continue current medications, diet and exercise. Patient agreed with treatment plan. Follow up as directed.      Electronically signed by Raheel Virk PA-C on 11/15/2017 at 2:38 PM

## 2017-12-20 ENCOUNTER — OFFICE VISIT (OUTPATIENT)
Dept: FAMILY MEDICINE CLINIC | Age: 22
End: 2017-12-20
Payer: COMMERCIAL

## 2017-12-20 VITALS
OXYGEN SATURATION: 98 % | SYSTOLIC BLOOD PRESSURE: 128 MMHG | TEMPERATURE: 97.4 F | HEIGHT: 70 IN | BODY MASS INDEX: 33.14 KG/M2 | DIASTOLIC BLOOD PRESSURE: 82 MMHG | WEIGHT: 231.5 LBS | HEART RATE: 78 BPM

## 2017-12-20 DIAGNOSIS — J01.90 ACUTE BACTERIAL SINUSITIS: ICD-10-CM

## 2017-12-20 DIAGNOSIS — R73.9 HYPERGLYCEMIA: ICD-10-CM

## 2017-12-20 DIAGNOSIS — Z11.4 ENCOUNTER FOR SCREENING FOR HIV: ICD-10-CM

## 2017-12-20 DIAGNOSIS — G47.33 OSA (OBSTRUCTIVE SLEEP APNEA): Primary | ICD-10-CM

## 2017-12-20 DIAGNOSIS — B96.89 ACUTE BACTERIAL SINUSITIS: ICD-10-CM

## 2017-12-20 DIAGNOSIS — Z72.0 TOBACCO ABUSE: ICD-10-CM

## 2017-12-20 DIAGNOSIS — E55.9 VITAMIN D DEFICIENCY: ICD-10-CM

## 2017-12-20 PROCEDURE — 99214 OFFICE O/P EST MOD 30 MIN: CPT | Performed by: PHYSICIAN ASSISTANT

## 2017-12-20 RX ORDER — BUPROPION HYDROCHLORIDE 150 MG/1
150 TABLET, EXTENDED RELEASE ORAL 2 TIMES DAILY
Qty: 60 TABLET | Refills: 3 | Status: SHIPPED | OUTPATIENT
Start: 2017-12-20 | End: 2018-08-15 | Stop reason: ALTCHOICE

## 2017-12-20 RX ORDER — AMOXICILLIN AND CLAVULANATE POTASSIUM 875; 125 MG/1; MG/1
1 TABLET, FILM COATED ORAL 2 TIMES DAILY
Qty: 20 TABLET | Refills: 0 | Status: SHIPPED | OUTPATIENT
Start: 2017-12-20 | End: 2017-12-30

## 2017-12-20 ASSESSMENT — ENCOUNTER SYMPTOMS
VOMITING: 0
CHEST TIGHTNESS: 0
DIARRHEA: 0
VISUAL CHANGE: 0
EYE ITCHING: 0
SORE THROAT: 0
EYE DISCHARGE: 0
WHEEZING: 1
NAUSEA: 0
ABDOMINAL PAIN: 0
BOWEL INCONTINENCE: 0
COUGH: 1
HEARTBURN: 0
RHINORRHEA: 1
HEMOPTYSIS: 0
BACK PAIN: 0
SHORTNESS OF BREATH: 0
SINUS PRESSURE: 0

## 2017-12-20 NOTE — PROGRESS NOTES
[facility] [address]  [address] [place] [id number]  Dept: [phone]  Dept Fax: [phone]    Jordyn Gonzáles is a 20 y.o. male who presents today for his medical conditions/complaints as noted below. Jordyn Gonzáles is c/o of   Chief Complaint   Patient presents with    Medication Check     wellbutrin         HPI:     Loss of Consciousness   This is a recurrent problem. The current episode started yesterday. Associated symptoms include dizziness, light-headedness, malaise/fatigue and palpitations. Pertinent negatives include no abdominal pain, auditory change, aura, back pain, bladder incontinence, bowel incontinence, chest pain, clumsiness, confusion, diaphoresis, fever, focal sensory loss, focal weakness, headaches, nausea, slurred speech, vertigo, visual change, vomiting or weakness. There is no history of DM, HTN, a sudden death in family or TIA. Cough   This is a new problem. The current episode started in the past 7 days. The cough is productive of sputum. Associated symptoms include chills, ear congestion, nasal congestion, postnasal drip, rhinorrhea and wheezing. Pertinent negatives include no chest pain, ear pain, fever, headaches, heartburn, hemoptysis, myalgias, rash, sore throat, shortness of breath, sweats or weight loss.        Hemoglobin A1C (%)   Date Value   01/30/2017 5.0             ( goal A1C is < 7)   No results found for: LABMICR  LDL Cholesterol (mg/dL)   Date Value   01/24/2017 128       (goal LDL is <100)   AST (U/L)   Date Value   07/05/2017 28     ALT (U/L)   Date Value   07/05/2017 25     BUN (mg/dL)   Date Value   07/05/2017 12     BP Readings from Last 3 Encounters:   12/20/17 128/82   11/15/17 132/78   10/16/17 101/82          (goal 120/80)    Past Medical History:   Diagnosis Date    Arthritis     Asthma     Back pain     Blood platelet disorder (HCC)     GERD (gastroesophageal reflux disease)     Syncope       Past Surgical History:   Procedure Laterality Date    HERNIA REPAIR         Family History   Problem Relation Age of Onset    No Known Problems Mother     Other Father     Asthma Brother     Other Brother        Social History   Substance Use Topics    Smoking status: Current Every Day Smoker     Packs/day: 0.50     Years: 1.00     Types: Cigarettes    Smokeless tobacco: Never Used    Alcohol use Yes      Comment: occassion      Current Outpatient Prescriptions   Medication Sig Dispense Refill    famotidine (PEPCID) 40 MG tablet       omeprazole (PRILOSEC) 40 MG delayed release capsule       albuterol sulfate HFA (PROAIR HFA) 108 (90 Base) MCG/ACT inhaler Inhale 2 puffs into the lungs every 6 hours as needed for Wheezing 1 Inhaler 3    buPROPion (WELLBUTRIN SR) 150 MG extended release tablet Take 1 tablet by mouth 2 times daily 60 tablet 0    clobetasol (TEMOVATE) 0.05 % cream Apply sparingly bid to affected areas until resolved. Do not use longer than 2 weeks. 1 Tube 1    loratadine (CLARITIN) 10 MG tablet Take 1 tablet by mouth daily 30 tablet 1    sucralfate (CARAFATE) 1 GM/10ML suspension Take 10 mLs by mouth 4 times daily 1200 mL 0    ondansetron (ZOFRAN ODT) 4 MG disintegrating tablet Take 1 tablet by mouth every 8 hours as needed for Nausea or Vomiting 20 tablet 0    aluminum & magnesium hydroxide-simethicone (MAALOX MAX) 400-400-40 MG/5ML SUSP Take 30 mLs by mouth every 6 hours as needed (Upset stomach/abdominal pain) 1 Bottle 0    lidocaine (LIDODERM) 5 % Place 1 patch onto the skin daily Apply over the affected area; 12 hours on, 12 hours off. (Patient taking differently: Place 1 patch onto the skin daily as needed for Pain (none on in ED as of 2/02/17) Apply over the affected area; 12 hours on, 12 hours off.) 30 patch 2    ibuprofen (ADVIL;MOTRIN) 800 MG tablet Take 1 tablet by mouth every 8 hours as needed for Pain 30 tablet 0     No current facility-administered medications for this visit. discharge. No scleral icterus. Neck: Normal range of motion. Neck supple. No tracheal deviation present. No thyromegaly present. Cardiovascular: Normal rate, regular rhythm and normal heart sounds. Exam reveals no gallop and no friction rub. No murmur heard. Pulmonary/Chest: Effort normal and breath sounds normal. No stridor. No respiratory distress. He has no wheezes. He has no rales. He exhibits no tenderness. Rhonchi   Abdominal: Soft. Bowel sounds are normal. He exhibits no distension. There is no tenderness. There is no rebound and no guarding. Musculoskeletal: He exhibits no edema. Neurological: He is alert and oriented to person, place, and time. Gait normal.   Skin: Skin is warm and dry. No rash noted. He is not diaphoretic. Psychiatric: He has a normal mood and affect. His affect is not inappropriate. Nursing note and vitals reviewed. /82   Pulse 78   Temp 97.4 °F (36.3 °C) (Oral)   Ht 5' 10\" (1.778 m)   Wt 231 lb 8 oz (105 kg)   SpO2 98%   BMI 33.22 kg/m²     Assessment:      1. AMY (obstructive sleep apnea)     2. Tobacco abuse     3. Hyperglycemia     4. Vitamin D deficiency     5. Acute bacterial sinusitis     6. Encounter for screening for HIV               Plan:      No Follow-up on file. No orders of the defined types were placed in this encounter. No orders of the defined types were placed in this encounter. ER visit for syncope 1/29/17 reviewed. Pt states wasn't feeling well. Then unconscious for 20 min. Pt states only new thing was wellbutrin. Has been taking it for a few days. Pt did not notice any sx once starting it. Labs and CT reviewed. Pt states he did see Dr. Fay Tilley approx 2016 and had a normal cardiac work up. Will obtain records. Tilt table rx. MRI and neuro referral.  Saw neuro - Dr. Alesia Oakley in [place]. Had EEG - pending. ECHO 3/17/17 nl. Also had tilt table test.      Depression - Pt states bullied in HS.  Considered suicide freshman year HS. Pt states no issues since.       Back pain - Chronic. T9 and T10 bulging disc per pt. States old workmans comp case. Saw Dr. Bao Spear. Told no surgery. Pain management recommended it at that time. Referral generated. MRI 6/26/16 showed minimal degenerative changes with no evidence of acute fracture or other abnormality.       Knee pain - Right. Pain medially. Xray and PT     Tobacco abuse - Pt encouraged to stop. Understands risk. Smoking 1 ppd. Xray ordered. Poss adverse rxn to wellbutrin. Tolerating well. Cont med.     Balanced diet and routine exercise encouraged.     Multivitamin with vitamin D daily encouraged.     Good sleep hygiene encouraged.     Dental exam q 6 mo.     Vision yearly.     Sunscreen encouraged.     Immunizations reviewed.     Tdap - 1/17/17     Flu - 1/17/17     Prevnar 13 - 1/17/17     Clarise Furnace disease - Dad. Pt states he actually has blood pool def. Patient given educational materials - see patient instructions. Discussed use, benefit, and side effects of prescribed medications. All patient questions answered. Pt voiced understanding. Reviewed health maintenance. Instructed to continue current medications, diet and exercise. Patient agreed with treatment plan. Follow up as directed.      Electronically signed by Eyal Damon PA-C on 12/20/2017 at 3:21 PM

## 2018-01-13 DIAGNOSIS — R00.2 PALPITATION: ICD-10-CM

## 2018-01-13 DIAGNOSIS — R55 NEAR SYNCOPE: ICD-10-CM

## 2018-02-02 DIAGNOSIS — L23.9 ALLERGIC DERMATITIS: ICD-10-CM

## 2018-02-02 RX ORDER — BENZOYL PEROXIDE
KIT TOPICAL
Qty: 30 TABLET | Refills: 1 | Status: SHIPPED | OUTPATIENT
Start: 2018-02-02 | End: 2022-04-07

## 2018-02-02 NOTE — TELEPHONE ENCOUNTER
Next Visit Date:  Future Appointments  Date Time Provider Junior Vera   3/26/2018 9:15 AM Bernie Franklin PA-C [facility] Maintenance   Topic Date Due    HIV screen  08/17/2009    Pneumococcal med risk (1 of 1 - PPSV23) 08/17/2013    DTaP/Tdap/Td vaccine (2 - Td) 01/17/2027    Flu vaccine  Completed       Hemoglobin A1C (%)   Date Value   01/30/2017 5.0             ( goal A1C is < 7)   No results found for: LABMICR  LDL Cholesterol (mg/dL)   Date Value   01/24/2017 128       (goal LDL is <100)   AST (U/L)   Date Value   07/05/2017 28     ALT (U/L)   Date Value   07/05/2017 25     BUN (mg/dL)   Date Value   07/05/2017 12     BP Readings from Last 3 Encounters:   12/20/17 128/82   11/15/17 132/78   10/16/17 101/82          (goal 120/80)    All Future Testing planned in [facility]  Lab Frequency Next Occurrence   CBC Auto Differential Once 12/20/2018   Comprehensive Metabolic Panel Once 94/60/3836   Lipid Panel Once 12/20/2018   TSH with Reflex Once 12/20/2018   Vitamin D 25 Hydroxy Once 12/20/2018   Insulin, Total Once 12/20/2018   Hemoglobin A1C Once 12/20/2018   HIV Screen Once 12/20/2018               Patient Active Problem List:     Tobacco abuse     Chronic low back pain     Hyperglycemia     Vitamin D deficiency     Degenerative disc disease, thoracic     Osteoarthritis of thoracic spine     Syncope     Dermatitis

## 2018-08-15 ENCOUNTER — OFFICE VISIT (OUTPATIENT)
Dept: FAMILY MEDICINE CLINIC | Age: 23
End: 2018-08-15
Payer: COMMERCIAL

## 2018-08-15 VITALS
DIASTOLIC BLOOD PRESSURE: 70 MMHG | BODY MASS INDEX: 27.35 KG/M2 | HEIGHT: 70 IN | TEMPERATURE: 98 F | HEART RATE: 77 BPM | WEIGHT: 191 LBS | SYSTOLIC BLOOD PRESSURE: 112 MMHG | OXYGEN SATURATION: 98 %

## 2018-08-15 DIAGNOSIS — E55.9 VITAMIN D DEFICIENCY: ICD-10-CM

## 2018-08-15 DIAGNOSIS — K62.5 RECTAL BLEED: ICD-10-CM

## 2018-08-15 DIAGNOSIS — R73.9 HYPERGLYCEMIA: Primary | ICD-10-CM

## 2018-08-15 DIAGNOSIS — Z72.0 TOBACCO ABUSE: ICD-10-CM

## 2018-08-15 DIAGNOSIS — Z23 NEED FOR PROPHYLACTIC VACCINATION AGAINST STREPTOCOCCUS PNEUMONIAE (PNEUMOCOCCUS): ICD-10-CM

## 2018-08-15 DIAGNOSIS — M65.341 TRIGGER RING FINGER OF RIGHT HAND: ICD-10-CM

## 2018-08-15 PROCEDURE — 90471 IMMUNIZATION ADMIN: CPT | Performed by: PHYSICIAN ASSISTANT

## 2018-08-15 PROCEDURE — 99395 PREV VISIT EST AGE 18-39: CPT | Performed by: PHYSICIAN ASSISTANT

## 2018-08-15 PROCEDURE — 90732 PPSV23 VACC 2 YRS+ SUBQ/IM: CPT | Performed by: PHYSICIAN ASSISTANT

## 2018-08-15 RX ORDER — ROPINIROLE 1 MG/1
1 TABLET, FILM COATED ORAL NIGHTLY
Qty: 90 TABLET | Refills: 0 | Status: SHIPPED | OUTPATIENT
Start: 2018-08-15 | End: 2019-01-18 | Stop reason: ALTCHOICE

## 2018-08-15 RX ORDER — ROPINIROLE 1 MG/1
TABLET, FILM COATED ORAL
COMMUNITY
Start: 2018-07-04 | End: 2018-08-15 | Stop reason: SDUPTHER

## 2018-08-15 ASSESSMENT — ENCOUNTER SYMPTOMS
EYE ITCHING: 0
DIARRHEA: 0
EYE DISCHARGE: 0
CHEST TIGHTNESS: 0
SINUS PRESSURE: 0

## 2018-08-15 NOTE — PROGRESS NOTES
regular rhythm and normal heart sounds. Exam reveals no gallop and no friction rub. No murmur heard. Pulmonary/Chest: Effort normal and breath sounds normal. No stridor. No respiratory distress. He has no wheezes. He has no rales. He exhibits no tenderness. Abdominal: Soft. Bowel sounds are normal. He exhibits no distension. There is no tenderness. There is no rebound and no guarding. Musculoskeletal: He exhibits no edema. Trigger finger right hand 4th digits   Neurological: He is alert and oriented to person, place, and time. Gait normal.   Skin: Skin is warm and dry. No rash noted. He is not diaphoretic. Psychiatric: He has a normal mood and affect. His affect is not inappropriate. Nursing note and vitals reviewed. /70   Pulse 77   Temp 98 °F (36.7 °C) (Oral)   Ht 5' 10\" (1.778 m)   Wt 191 lb (86.6 kg)   SpO2 98%   BMI 27.41 kg/m²     Assessment:       Diagnosis Orders   1. Hyperglycemia     2. Need for prophylactic vaccination against Streptococcus pneumoniae (pneumococcus)  Pneumococcal polysaccharide vaccine 23-valent PPSV23   3. Trigger ring finger of right hand  Amb External Referral To Orthopedic Surgery   4. Vitamin D deficiency     5. Rectal bleed  [facility]- Preeti Robbins MD   6. Tobacco abuse  XR CHEST STANDARD (2 VW)             Plan:      No Follow-up on file.     Orders Placed This Encounter   Procedures    XR CHEST STANDARD (2 VW)     Standing Status:   Future     Standing Expiration Date:   8/15/2019    Pneumococcal polysaccharide vaccine 23-valent PPSV23    Amb External Referral To Orthopedic Surgery     Referral Priority:   Routine     Referral Type:   Consult for Advice and Opinion     Referred to Provider:   Ana Gonzalez MD     Requested Specialty:   Orthopedic Surgery     Number of Visits Requested:   [phone] Erlin Robbins MD     Referral Priority:   Routine     Referral Type:   Eval and Treat 1/17/17     Flu - 1/17/17     Prevnar 13 - 1/17/17    Pneumovax 23 - 8/15/18     [place] disease - Dad. Pt states he actually has blood pool def. Patient given educational materials - see patient instructions. Discussed use, benefit, and side effects of prescribed medications. All patient questions answered. Pt voiced understanding. Reviewed health maintenance. Instructed to continue current medications, diet and exercise. Patient agreed with treatment plan. Follow up as directed.      Electronically signed by Scottie Ballesteros PA-C on 8/15/2018 at 3:33 PM

## 2019-01-14 ENCOUNTER — TELEPHONE (OUTPATIENT)
Dept: FAMILY MEDICINE CLINIC | Age: 24
End: 2019-01-14

## 2019-01-14 ENCOUNTER — HOSPITAL ENCOUNTER (OUTPATIENT)
Age: 24
Discharge: HOME | End: 2019-01-14
Payer: COMMERCIAL

## 2019-01-14 LAB
-: NORMAL
ABSOLUTE EOS #: 0.3 K/UL (ref 0–0.4)
ABSOLUTE IMMATURE GRANULOCYTE: ABNORMAL K/UL (ref 0–0.3)
ABSOLUTE LYMPH #: 2.1 K/UL (ref 1–4.8)
ABSOLUTE MONO #: 0.8 K/UL (ref 0.1–1.3)
ALBUMIN SERPL-MCNC: 4.1 G/DL (ref 3.5–5.2)
ALBUMIN/GLOBULIN RATIO: NORMAL (ref 1–2.5)
ALP BLD-CCNC: 65 U/L (ref 40–129)
ALT SERPL-CCNC: 12 U/L (ref 5–41)
ANION GAP SERPL CALCULATED.3IONS-SCNC: 10 MMOL/L (ref 9–17)
AST SERPL-CCNC: 18 U/L
BASOPHILS # BLD: 1 % (ref 0–2)
BASOPHILS ABSOLUTE: 0 K/UL (ref 0–0.2)
BILIRUB SERPL-MCNC: 0.83 MG/DL (ref 0.3–1.2)
BUN BLDV-MCNC: 11 MG/DL (ref 6–20)
BUN/CREAT BLD: NORMAL (ref 9–20)
CALCIUM SERPL-MCNC: 9.1 MG/DL (ref 8.6–10.4)
CHLORIDE BLD-SCNC: 104 MMOL/L (ref 98–107)
CHOLESTEROL/HDL RATIO: 3.7
CHOLESTEROL: 143 MG/DL
CO2: 27 MMOL/L (ref 20–31)
CREAT SERPL-MCNC: 0.7 MG/DL (ref 0.7–1.2)
DIFFERENTIAL TYPE: ABNORMAL
EOSINOPHILS RELATIVE PERCENT: 3 % (ref 0–4)
GFR AFRICAN AMERICAN: >60 ML/MIN
GFR NON-AFRICAN AMERICAN: >60 ML/MIN
GFR SERPL CREATININE-BSD FRML MDRD: NORMAL ML/MIN/{1.73_M2}
GFR SERPL CREATININE-BSD FRML MDRD: NORMAL ML/MIN/{1.73_M2}
GLUCOSE BLD-MCNC: 85 MG/DL (ref 70–99)
HCT VFR BLD CALC: 48.1 % (ref 41–53)
HDLC SERPL-MCNC: 39 MG/DL
HEMOGLOBIN: 16 G/DL (ref 13.5–17.5)
HIV AG/AB: NONREACTIVE
IMMATURE GRANULOCYTES: ABNORMAL %
INSULIN COMMENT: NORMAL
INSULIN REFERENCE RANGE:: NORMAL
INSULIN: 5.7 MU/L
LDL CHOLESTEROL: 88 MG/DL (ref 0–130)
LYMPHOCYTES # BLD: 26 % (ref 24–44)
MCH RBC QN AUTO: 30.6 PG (ref 26–34)
MCHC RBC AUTO-ENTMCNC: 33.3 G/DL (ref 31–37)
MCV RBC AUTO: 91.9 FL (ref 80–100)
MONOCYTES # BLD: 10 % (ref 1–7)
NRBC AUTOMATED: ABNORMAL PER 100 WBC
PDW BLD-RTO: 12.7 % (ref 11.5–14.9)
PLATELET # BLD: 215 K/UL (ref 150–450)
PLATELET ESTIMATE: ABNORMAL
PMV BLD AUTO: 8.9 FL (ref 6–12)
POTASSIUM SERPL-SCNC: 4.1 MMOL/L (ref 3.7–5.3)
RBC # BLD: 5.23 M/UL (ref 4.5–5.9)
RBC # BLD: ABNORMAL 10*6/UL
REASON FOR REJECTION: NORMAL
SEG NEUTROPHILS: 60 % (ref 36–66)
SEGMENTED NEUTROPHILS ABSOLUTE COUNT: 4.7 K/UL (ref 1.3–9.1)
SODIUM BLD-SCNC: 141 MMOL/L (ref 135–144)
TOTAL PROTEIN: 6.8 G/DL (ref 6.4–8.3)
TRIGL SERPL-MCNC: 79 MG/DL
TSH SERPL DL<=0.05 MIU/L-ACNC: 3.91 MIU/L (ref 0.3–5)
VITAMIN D 25-HYDROXY: 23.9 NG/ML (ref 30–100)
VLDLC SERPL CALC-MCNC: ABNORMAL MG/DL (ref 1–30)
WBC # BLD: 7.8 K/UL (ref 3.5–11)
WBC # BLD: ABNORMAL 10*3/UL
ZZ NTE CLEAN UP: ORDERED TEST: NORMAL
ZZ NTE WITH NAME CLEAN UP: SPECIMEN SOURCE: NORMAL

## 2019-01-14 PROCEDURE — 85025 COMPLETE CBC W/AUTO DIFF WBC: CPT

## 2019-01-14 PROCEDURE — 87389 HIV-1 AG W/HIV-1&-2 AB AG IA: CPT

## 2019-01-14 PROCEDURE — 84443 ASSAY THYROID STIM HORMONE: CPT

## 2019-01-14 PROCEDURE — 83036 HEMOGLOBIN GLYCOSYLATED A1C: CPT

## 2019-01-14 PROCEDURE — 83525 ASSAY OF INSULIN: CPT

## 2019-01-14 PROCEDURE — 80053 COMPREHEN METABOLIC PANEL: CPT

## 2019-01-14 PROCEDURE — 82306 VITAMIN D 25 HYDROXY: CPT

## 2019-01-14 PROCEDURE — 36415 COLL VENOUS BLD VENIPUNCTURE: CPT

## 2019-01-14 PROCEDURE — 80061 LIPID PANEL: CPT

## 2019-01-15 LAB
ESTIMATED AVERAGE GLUCOSE: 91 MG/DL
HBA1C MFR BLD: 4.8 % (ref 4–6)

## 2019-01-18 ENCOUNTER — OFFICE VISIT (OUTPATIENT)
Dept: FAMILY MEDICINE CLINIC | Age: 24
End: 2019-01-18
Payer: COMMERCIAL

## 2019-01-18 VITALS
WEIGHT: 204 LBS | BODY MASS INDEX: 29.2 KG/M2 | HEIGHT: 70 IN | DIASTOLIC BLOOD PRESSURE: 70 MMHG | SYSTOLIC BLOOD PRESSURE: 122 MMHG | OXYGEN SATURATION: 99 % | TEMPERATURE: 98 F | HEART RATE: 74 BPM

## 2019-01-18 DIAGNOSIS — Z72.0 TOBACCO ABUSE: ICD-10-CM

## 2019-01-18 DIAGNOSIS — Z00.00 ANNUAL PHYSICAL EXAM: Primary | ICD-10-CM

## 2019-01-18 DIAGNOSIS — Z23 NEED FOR INFLUENZA VACCINATION: ICD-10-CM

## 2019-01-18 DIAGNOSIS — R73.9 HYPERGLYCEMIA: ICD-10-CM

## 2019-01-18 DIAGNOSIS — E55.9 VITAMIN D DEFICIENCY: ICD-10-CM

## 2019-01-18 PROCEDURE — 90471 IMMUNIZATION ADMIN: CPT | Performed by: PHYSICIAN ASSISTANT

## 2019-01-18 PROCEDURE — 90688 IIV4 VACCINE SPLT 0.5 ML IM: CPT | Performed by: PHYSICIAN ASSISTANT

## 2019-01-18 PROCEDURE — 99395 PREV VISIT EST AGE 18-39: CPT | Performed by: PHYSICIAN ASSISTANT

## 2019-01-18 RX ORDER — ACETAMINOPHEN 160 MG
2000 TABLET,DISINTEGRATING ORAL DAILY
Qty: 90 CAPSULE | Refills: 3 | Status: SHIPPED | OUTPATIENT
Start: 2019-01-18 | End: 2022-04-07

## 2019-01-18 ASSESSMENT — ENCOUNTER SYMPTOMS
SHORTNESS OF BREATH: 0
DIARRHEA: 0
EYE ITCHING: 0
RHINORRHEA: 0
EYE DISCHARGE: 0
VOMITING: 0
NAUSEA: 0
CHEST TIGHTNESS: 0
SORE THROAT: 0
SINUS PRESSURE: 0
COUGH: 0
ABDOMINAL PAIN: 0

## 2019-01-18 ASSESSMENT — PATIENT HEALTH QUESTIONNAIRE - PHQ9
SUM OF ALL RESPONSES TO PHQ QUESTIONS 1-9: 0
2. FEELING DOWN, DEPRESSED OR HOPELESS: 0
SUM OF ALL RESPONSES TO PHQ QUESTIONS 1-9: 0
1. LITTLE INTEREST OR PLEASURE IN DOING THINGS: 0
SUM OF ALL RESPONSES TO PHQ9 QUESTIONS 1 & 2: 0

## 2019-06-03 ENCOUNTER — OFFICE VISIT (OUTPATIENT)
Dept: FAMILY MEDICINE CLINIC | Age: 24
End: 2019-06-03
Payer: COMMERCIAL

## 2019-06-03 VITALS
WEIGHT: 221.8 LBS | SYSTOLIC BLOOD PRESSURE: 122 MMHG | HEART RATE: 77 BPM | HEIGHT: 70 IN | DIASTOLIC BLOOD PRESSURE: 75 MMHG | TEMPERATURE: 97.6 F | OXYGEN SATURATION: 98 % | BODY MASS INDEX: 31.75 KG/M2

## 2019-06-03 DIAGNOSIS — R53.83 OTHER FATIGUE: ICD-10-CM

## 2019-06-03 DIAGNOSIS — E55.9 VITAMIN D DEFICIENCY: ICD-10-CM

## 2019-06-03 DIAGNOSIS — R73.9 HYPERGLYCEMIA: Primary | ICD-10-CM

## 2019-06-03 DIAGNOSIS — Z72.0 TOBACCO ABUSE: ICD-10-CM

## 2019-06-03 PROCEDURE — 99214 OFFICE O/P EST MOD 30 MIN: CPT | Performed by: PHYSICIAN ASSISTANT

## 2019-06-03 ASSESSMENT — ENCOUNTER SYMPTOMS
EYE ITCHING: 0
SINUS PRESSURE: 0
DIARRHEA: 0
CHEST TIGHTNESS: 0
EYE DISCHARGE: 0

## 2019-06-03 NOTE — PROGRESS NOTES
[address] [id number] [place], [place] [address]  Dept: [phone]  Dept Fax: [phone]    Pushpa Yeboah is a 24 y.o. male who presents today for his medical conditions/complaints as noted below. Pushpa Yeboah is c/o of   Chief Complaint   Patient presents with    Trigger finger     right hand third finger- a few months         HPI:     Hand Pain    The incident occurred more than 1 week ago. There was no injury mechanism. The pain is at a severity of 3/10. The pain is mild.        Hemoglobin A1C (%)   Date Value   01/14/2019 4.8   01/30/2017 5.0             ( goal A1C is < 7)   No results found for: LABMICR  LDL Cholesterol (mg/dL)   Date Value   01/14/2019 88   01/24/2017 128       (goal LDL is <100)   AST (U/L)   Date Value   01/14/2019 18     ALT (U/L)   Date Value   01/14/2019 12     BUN (mg/dL)   Date Value   01/14/2019 11     BP Readings from Last 3 Encounters:   06/03/19 122/75   01/18/19 122/70   08/15/18 112/70          (goal 120/80)    Past Medical History:   Diagnosis Date    Arthritis     Asthma     Back pain     Blood platelet disorder (HCC)     GERD (gastroesophageal reflux disease)     Syncope       Past Surgical History:   Procedure Laterality Date    HERNIA REPAIR         Family History   Problem Relation Age of Onset    No Known Problems Mother     Other Father     Asthma Brother     Other Brother        Social History     Tobacco Use    Smoking status: Current Every Day Smoker     Packs/day: 0.50     Years: 1.00     Pack years: 0.50     Types: Cigarettes    Smokeless tobacco: Never Used   Substance Use Topics    Alcohol use: Yes     Comment: occassion      Current Outpatient Medications   Medication Sig Dispense Refill    Cholecalciferol (VITAMIN D3) 2000 units CAPS Take 1 capsule by mouth daily 90 capsule 3    EQ ALLERGY RELIEF 10 MG tablet TAKE ONE TABLET BY MOUTH ONCE DAILY 30 tablet 1    albuterol sulfate HFA (PROAIR HFA) 108 (90 Base) MCG/ACT inhaler Inhale 2 puffs into the lungs every 6 hours as needed for Wheezing 1 Inhaler 3    ibuprofen (ADVIL;MOTRIN) 800 MG tablet Take 1 tablet by mouth every 8 hours as needed for Pain 30 tablet 0     No current facility-administered medications for this visit. Allergies   Allergen Reactions    No Known Allergies     Seasonal        Health Maintenance   Topic Date Due    Varicella Vaccine (1 of 2 - 13+ 2-dose series) 07/24/2019 (Originally 8/17/2007)    DTaP/Tdap/Td vaccine (2 - Td) 01/17/2027    Flu vaccine  Completed    Pneumococcal 0-64 years Vaccine  Completed    HIV screen  Completed    HPV vaccine  Aged Out       Subjective:      Review of Systems   HENT: Negative for ear discharge and sinus pressure. Eyes: Negative for discharge and itching. Respiratory: Negative for chest tightness. Cardiovascular: Negative for leg swelling. Gastrointestinal: Negative for diarrhea. Genitourinary: Negative for dysuria and frequency. Musculoskeletal: Negative for neck stiffness. All other systems reviewed and are negative. Objective:     Physical Exam   Constitutional: He is oriented to person, place, and time. He appears well-developed and well-nourished. No distress. /78   Pulse 91   Temp 97.8 °F (36.6 °C) (Oral)   Ht 5' 10\" (1.778 m)   Wt 220 lb 6.4 oz (100 kg)   SpO2 97%   BMI 31.62 kg/m²      HENT:   Head: Normocephalic and atraumatic. Right Ear: External ear normal.   Left Ear: External ear normal.   Nose: Nose normal.   Mouth/Throat: Oropharynx is clear and moist.   PND   Eyes: Pupils are equal, round, and reactive to light. Conjunctivae and EOM are normal. Right eye exhibits no discharge. Left eye exhibits no discharge. No scleral icterus. Neck: Normal range of motion. Neck supple. No tracheal deviation present. No thyromegaly present. Cardiovascular: Normal rate, regular rhythm and normal heart sounds. Exam reveals no gallop and no friction rub. No murmur heard. Pulmonary/Chest: Effort normal and breath sounds normal. No stridor. No respiratory distress. He has no wheezes. He has no rales. He exhibits no tenderness. Abdominal: Soft. Bowel sounds are normal. He exhibits no distension. There is no tenderness. There is no rebound and no guarding. Musculoskeletal: He exhibits no edema. Trigger finger right hand 4th digits   Neurological: He is alert and oriented to person, place, and time. Gait normal.   Skin: Skin is warm and dry. No rash noted. He is not diaphoretic. Psychiatric: He has a normal mood and affect. His affect is not inappropriate. Nursing note and vitals reviewed. /75   Pulse 77   Temp 97.6 °F (36.4 °C) (Oral)   Ht 5' 10\" (1.778 m)   Wt 221 lb 12.8 oz (100.6 kg)   SpO2 98%   BMI 31.82 kg/m²     Assessment:       Diagnosis Orders   1. Hyperglycemia  Comprehensive Metabolic Panel    Insulin, Total    Hemoglobin A1C   2. Tobacco abuse     3. Vitamin D deficiency  Vitamin D 25 Hydroxy   4. BMI 31.0-31.9,adult  CBC Auto Differential    Comprehensive Metabolic Panel    Lipid Panel   5. Other fatigue  TSH without Reflex    Vitamin D 25 Hydroxy    Vitamin B12    Sedimentation rate, automated    C-Reactive Protein             Plan:      Return in about 6 months (around 12/3/2019).     Orders Placed This Encounter   Procedures    CBC Auto Differential     Standing Status:   Future     Standing Expiration Date:   6/2/2020    Comprehensive Metabolic Panel     Standing Status:   Future     Standing Expiration Date:   6/3/2020    Lipid Panel     Standing Status:   Future     Standing Expiration Date:   6/2/2020     Order Specific Question:   Is Patient Fasting?/# of Hours     Answer:   yes    TSH without Reflex     Standing Status:   Future     Standing Expiration Date:   12/2/2019    Insulin, Total     Standing Status:   Future     Standing Expiration Date:   6/3/2020    Hemoglobin A1C     Standing Status:   Future Standing Expiration Date:   6/3/2020    Vitamin D 25 Hydroxy     Standing Status:   Future     Standing Expiration Date:   6/3/2020    Vitamin B12     Standing Status:   Future     Standing Expiration Date:   9/1/2019    Sedimentation rate, automated     Standing Status:   Future     Standing Expiration Date:   6/2/2020    C-Reactive Protein     Standing Status:   Future     Standing Expiration Date:   6/3/2020     No orders of the defined types were placed in this encounter. Labs 1/2019    ER visit for syncope 1/29/17 reviewed. Pt states wasn't feeling well. Then unconscious for 20 min. Pt states only new thing was wellbutrin. Has been taking it for a few days. Pt did not notice any sx once starting it. Labs and CT reviewed. Pt states he did see Dr. Zeyad Zamora approx 2016 and had a normal cardiac work up. Will obtain records. Tilt table rx. MRI and neuro referral.  Saw neuro - Dr. Stacy Zuniga in [place]. Had EEG - pending. ECHO 3/17/17 nl. Also had tilt table test.      Depression - Pt states bullied in HS. Considered suicide freshman year HS. Pt states no issues since. No SI or HI.     Back pain - Chronic. T9 and T10 bulging disc per pt. States old workmans comp case. Saw Dr. Prasanth Amado. Told no surgery. Pain management recommended it at that time. Referral generated. MRI 6/26/16 showed minimal degenerative changes with no evidence of acute fracture or other abnormality.       Knee pain - Right. Pain medially. Xray and PT     Trigger finger - Referral to hands. Saw Dr. Ruiz Amador. Had injection. Did not like the injection. Poss surgery. Tobacco abuse - Pt encouraged to stop. Understands risk. Smoking 1 ppd. Xray ordered. Poss adverse rxn to wellbutrin. Tolerating well. Cont med.     Rectal bleeding - x 2. Some pain with BM. Pt denies constipation. No family hx colon CA. Pt has had intentional wt loss. Increase fiber and fluid. RLS - Requip after sleep specialist.  Will cont med.     Balanced diet and routine exercise encouraged.     Multivitamin with vitamin D daily encouraged.     Good sleep hygiene encouraged.     Dental exam q 6 mo.     Vision yearly.     Sunscreen encouraged.     Immunizations reviewed.     Tdap - 1/17/17     Flu - 1/17/17     Prevnar 13 - 1/17/17    Pneumovax 23 - 8/15/18     Arlette Heal disease - Dad. Pt states he actually has blood pool def. Patient given educational materials - see patient instructions. Discussed use, benefit, and side effects of prescribed medications. All patient questions answered. Pt voiced understanding. Reviewed health maintenance. Instructed to continue current medications, diet and exercise. Patient agreed with treatment plan. Follow up as directed.      Electronically signed by Yesenia Tilley PA-C on 6/3/2019 at 10:35 AM

## 2021-05-07 ENCOUNTER — HOSPITAL ENCOUNTER (EMERGENCY)
Age: 26
Discharge: HOME | End: 2021-05-07
Attending: EMERGENCY MEDICINE
Payer: COMMERCIAL

## 2021-05-07 VITALS
HEART RATE: 83 BPM | OXYGEN SATURATION: 96 % | WEIGHT: 250 LBS | HEIGHT: 69 IN | RESPIRATION RATE: 16 BRPM | DIASTOLIC BLOOD PRESSURE: 88 MMHG | SYSTOLIC BLOOD PRESSURE: 157 MMHG | TEMPERATURE: 97.3 F | BODY MASS INDEX: 37.03 KG/M2

## 2021-05-07 DIAGNOSIS — S61.511A WRIST LACERATION, RIGHT, INITIAL ENCOUNTER: Primary | ICD-10-CM

## 2021-05-07 PROCEDURE — 12001 RPR S/N/AX/GEN/TRNK 2.5CM/<: CPT

## 2021-05-07 PROCEDURE — 99283 EMERGENCY DEPT VISIT LOW MDM: CPT

## 2021-05-07 PROCEDURE — 6360000002 HC RX W HCPCS: Performed by: EMERGENCY MEDICINE

## 2021-05-07 PROCEDURE — 90715 TDAP VACCINE 7 YRS/> IM: CPT | Performed by: EMERGENCY MEDICINE

## 2021-05-07 PROCEDURE — 90471 IMMUNIZATION ADMIN: CPT | Performed by: EMERGENCY MEDICINE

## 2021-05-07 RX ADMIN — TETANUS TOXOID, REDUCED DIPHTHERIA TOXOID AND ACELLULAR PERTUSSIS VACCINE, ADSORBED 0.5 ML: 5; 2.5; 8; 8; 2.5 SUSPENSION INTRAMUSCULAR at 20:53

## 2021-05-07 ASSESSMENT — PAIN SCALES - GENERAL: PAINLEVEL_OUTOF10: 6

## 2021-05-07 ASSESSMENT — PAIN DESCRIPTION - PAIN TYPE: TYPE: ACUTE PAIN

## 2021-05-08 NOTE — ED PROVIDER NOTES
EMERGENCY DEPARTMENT ENCOUNTER    Pt Name: Manuel Moon  MRN: [id number]  [place] 1995  Date of evaluation: 5/7/21  CHIEF COMPLAINT       Chief Complaint   Patient presents with    Laceration     HISTORY OF PRESENT ILLNESS     Laceration  Location: right wrist.  Depth:  Cutaneous  Quality: straight    Bleeding: venous    Time since incident:  2 hours  Injury mechanism: was hanging a broom on a shelf, cut his right wrist on the shelf. Pain details:     Severity:  No pain  Relieved by:  Nothing  Worsened by:  Nothing    Washed it with water  [facility] sent him here to get it closed      REVIEW OF SYSTEMS     Review of Systems   All other systems reviewed and are negative. 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    Osteoarthritis of thoracic spine M47.814    Syncope R55    Dermatitis L30.9     SURGICAL HISTORY       Past Surgical History:   Procedure Laterality Date    HERNIA REPAIR       CURRENT MEDICATIONS       Previous Medications    ALBUTEROL SULFATE HFA (PROAIR HFA) 108 (90 BASE) MCG/ACT INHALER    Inhale 2 puffs into the lungs every 6 hours as needed for Wheezing    CHOLECALCIFEROL (VITAMIN D3) 2000 UNITS CAPS    Take 1 capsule by mouth daily    EQ ALLERGY RELIEF 10 MG TABLET    TAKE ONE TABLET BY MOUTH ONCE DAILY    IBUPROFEN (ADVIL;MOTRIN) 800 MG TABLET    Take 1 tablet by mouth every 8 hours as needed for Pain     ALLERGIES     is allergic to no known allergies and seasonal.  FAMILY HISTORY     He indicated that his mother is alive. He indicated that his father is alive. He indicated that his brother is alive.  He indicated that his maternal grandmother is . He indicated that his maternal grandfather is . SOCIAL HISTORY       Social History     Tobacco Use    Smoking status: Current Every Day Smoker     Packs/day: 0.50     Years: 1.00     Pack years: 0.50     Types: Cigarettes    Smokeless tobacco: Never Used   Substance Use Topics    Alcohol use: Yes     Comment: occassion    Drug use: No     PHYSICAL EXAM     INITIAL VITALS: BP (!) 157/88   Pulse 83   Temp 97.3 °F (36.3 °C) (Oral)   Resp 16   Ht 5' 9\" (1.753 m)   Wt 250 lb (113.4 kg)   SpO2 96%   BMI 36.92 kg/m²    Physical Exam  Vitals signs reviewed. Constitutional:       General: He is not in acute distress. Appearance: Normal appearance. He is well-developed. He is not diaphoretic. HENT:      Head: Normocephalic and atraumatic. Right Ear: External ear normal.      Left Ear: External ear normal.   Eyes:      General:         Right eye: No discharge. Left eye: No discharge. Conjunctiva/sclera: Conjunctivae normal.   Neck:      Trachea: No tracheal deviation. Cardiovascular:      Rate and Rhythm: Normal rate. Pulses: Normal pulses. Comments: Radial pulse 2+ right hand, cap refill less than 2 sec in all right fingers  Pulmonary:      Effort: Pulmonary effort is normal.   Musculoskeletal: Normal range of motion. General: No tenderness or deformity. Skin:     General: Skin is warm and dry. Capillary Refill: Capillary refill takes less than 2 seconds. Findings: No erythema or rash. Neurological:      General: No focal deficit present. Mental Status: He is alert and oriented to person, place, and time. Cranial Nerves: No cranial nerve deficit. Coordination: Coordination normal.      Comments: Right hand neuro intact MRU nerve dist   Psychiatric:         Mood and Affect: Mood normal.         Behavior: Behavior normal.         Thought Content:  Thought content normal. [address]  [phone]    Schedule an appointment as soon as possible for a visit in 3 days      DISCHARGE MEDICATIONS:  New Prescriptions    No medications on file     Millie Galan MD  Attending Emergency Physician                    Millie Galan MD  05/07/21 [id number]

## 2021-05-10 ENCOUNTER — TELEPHONE (OUTPATIENT)
Dept: FAMILY MEDICINE CLINIC | Age: 26
End: 2021-05-10

## 2021-05-10 NOTE — TELEPHONE ENCOUNTER
[facility] ([place] [facility]) ED Follow up Call    Reason for ED visit:  Wrist injury at work. ... [employer] Comp case             Did you receive discharge instructions? Yes  Do you understand the discharge instructions? Yes  Did the ED give you any new prescriptions? No:   Were you able to fill your prescriptions? na      Do you have one of our red, yellow and green  Zone sheets that help you to determine when you should go to the ED? Yes      Do you need or want to make a follow up appt with your PCP? No    Do you have any further needs in the home i.e. Equipment? Not Applicable        FU appts/Provider:    No future appointments.     VOICEMAIL DOCUMENTATION - ERASE IF NOT USED

## 2022-04-07 ENCOUNTER — HOSPITAL ENCOUNTER (OUTPATIENT)
Age: 27
Setting detail: SPECIMEN
Discharge: HOME | End: 2022-04-07

## 2022-04-07 DIAGNOSIS — Z13.29 THYROID DISORDER SCREENING: ICD-10-CM

## 2022-04-07 DIAGNOSIS — Z11.59 ENCOUNTER FOR HEPATITIS C SCREENING TEST FOR LOW RISK PATIENT: ICD-10-CM

## 2022-04-07 DIAGNOSIS — Z13.220 SCREENING CHOLESTEROL LEVEL: ICD-10-CM

## 2022-04-07 DIAGNOSIS — Z83.3 FAMILY HISTORY OF DIABETES MELLITUS: ICD-10-CM

## 2022-04-07 DIAGNOSIS — Z13.1 ENCOUNTER FOR SCREENING FOR DIABETES MELLITUS: ICD-10-CM

## 2022-04-07 DIAGNOSIS — Z00.00 ROUTINE HEALTH MAINTENANCE: ICD-10-CM

## 2022-04-07 DIAGNOSIS — Z72.0 VAPES NICOTINE CONTAINING SUBSTANCE: ICD-10-CM

## 2022-04-07 DIAGNOSIS — E55.9 VITAMIN D DEFICIENCY: ICD-10-CM

## 2022-04-07 LAB
ABSOLUTE EOS #: 0.24 K/UL (ref 0–0.44)
ABSOLUTE IMMATURE GRANULOCYTE: <0.03 K/UL (ref 0–0.3)
ABSOLUTE LYMPH #: 2.23 K/UL (ref 1.1–3.7)
ABSOLUTE MONO #: 0.89 K/UL (ref 0.1–1.2)
ALBUMIN SERPL-MCNC: 4.3 G/DL (ref 3.5–5.2)
ALBUMIN/GLOBULIN RATIO: 1.4 (ref 1–2.5)
ALP BLD-CCNC: 93 U/L (ref 40–129)
ALT SERPL-CCNC: 21 U/L (ref 5–41)
ANION GAP SERPL CALCULATED.3IONS-SCNC: 10 MMOL/L (ref 9–17)
AST SERPL-CCNC: 20 U/L
BASOPHILS # BLD: 1 % (ref 0–2)
BASOPHILS ABSOLUTE: 0.05 K/UL (ref 0–0.2)
BILIRUB SERPL-MCNC: 0.52 MG/DL (ref 0.3–1.2)
BUN BLDV-MCNC: 12 MG/DL (ref 6–20)
CALCIUM SERPL-MCNC: 9.3 MG/DL (ref 8.6–10.4)
CHLORIDE BLD-SCNC: 106 MMOL/L (ref 98–107)
CHOLESTEROL/HDL RATIO: 4.7
CHOLESTEROL: 174 MG/DL
CO2: 25 MMOL/L (ref 20–31)
CREAT SERPL-MCNC: 0.84 MG/DL (ref 0.7–1.2)
EOSINOPHILS RELATIVE PERCENT: 3 % (ref 1–4)
ESTIMATED AVERAGE GLUCOSE: 97 MG/DL
GFR AFRICAN AMERICAN: >60 ML/MIN
GFR NON-AFRICAN AMERICAN: >60 ML/MIN
GFR SERPL CREATININE-BSD FRML MDRD: NORMAL ML/MIN/{1.73_M2}
GLUCOSE BLD-MCNC: 87 MG/DL (ref 70–99)
HBA1C MFR BLD: 5 % (ref 4–6)
HCT VFR BLD CALC: 48.6 % (ref 40.7–50.3)
HDLC SERPL-MCNC: 37 MG/DL
HEMOGLOBIN: 16.4 G/DL (ref 13–17)
HEPATITIS C ANTIBODY: NONREACTIVE
IMMATURE GRANULOCYTES: 0 %
LDL CHOLESTEROL: 119 MG/DL (ref 0–130)
LYMPHOCYTES # BLD: 25 % (ref 24–43)
MCH RBC QN AUTO: 30 PG (ref 25.2–33.5)
MCHC RBC AUTO-ENTMCNC: 33.7 G/DL (ref 28.4–34.8)
MCV RBC AUTO: 88.8 FL (ref 82.6–102.9)
MONOCYTES # BLD: 10 % (ref 3–12)
NRBC AUTOMATED: 0 PER 100 WBC
PDW BLD-RTO: 12 % (ref 11.8–14.4)
PLATELET # BLD: 256 K/UL (ref 138–453)
PMV BLD AUTO: 10 FL (ref 8.1–13.5)
POTASSIUM SERPL-SCNC: 4.5 MMOL/L (ref 3.7–5.3)
RBC # BLD: 5.47 M/UL (ref 4.21–5.77)
SEG NEUTROPHILS: 61 % (ref 36–65)
SEGMENTED NEUTROPHILS ABSOLUTE COUNT: 5.43 K/UL (ref 1.5–8.1)
SODIUM BLD-SCNC: 141 MMOL/L (ref 135–144)
TOTAL PROTEIN: 7.4 G/DL (ref 6.4–8.3)
TRIGL SERPL-MCNC: 88 MG/DL
TSH SERPL DL<=0.05 MIU/L-ACNC: 1.94 UIU/ML (ref 0.3–5)
VITAMIN D 25-HYDROXY: 15.9 NG/ML
WBC # BLD: 8.9 K/UL (ref 3.5–11.3)

## 2022-11-01 ENCOUNTER — HOSPITAL ENCOUNTER (OUTPATIENT)
Age: 27
Discharge: HOME | End: 2022-11-01

## 2022-11-01 ENCOUNTER — HOSPITAL ENCOUNTER (OUTPATIENT)
Dept: GENERAL RADIOLOGY | Age: 27
Discharge: HOME | End: 2022-11-03

## 2022-11-01 DIAGNOSIS — S69.92XA INJURY OF LEFT RING FINGER, INITIAL ENCOUNTER: ICD-10-CM

## 2022-11-01 PROCEDURE — 73130 X-RAY EXAM OF HAND: CPT

## 2023-09-04 ENCOUNTER — HOSPITAL ENCOUNTER (EMERGENCY)
Age: 28
Discharge: HOME | End: 2023-09-04
Attending: EMERGENCY MEDICINE
Payer: COMMERCIAL

## 2023-09-04 ENCOUNTER — APPOINTMENT (OUTPATIENT)
Dept: CT IMAGING | Age: 28
End: 2023-09-04
Payer: COMMERCIAL

## 2023-09-04 VITALS
DIASTOLIC BLOOD PRESSURE: 64 MMHG | TEMPERATURE: 98.7 F | OXYGEN SATURATION: 96 % | HEART RATE: 103 BPM | WEIGHT: 255 LBS | SYSTOLIC BLOOD PRESSURE: 121 MMHG | HEIGHT: 69 IN | BODY MASS INDEX: 37.77 KG/M2 | RESPIRATION RATE: 18 BRPM

## 2023-09-04 DIAGNOSIS — M62.838 SPASM OF MUSCLE: Primary | ICD-10-CM

## 2023-09-04 LAB
ABSOLUTE BANDS #: 0.46 K/UL (ref 0–1)
ALBUMIN SERPL-MCNC: 4 G/DL (ref 3.5–5.2)
ALP SERPL-CCNC: 96 U/L (ref 40–129)
ALT SERPL-CCNC: 22 U/L (ref 5–41)
ANION GAP SERPL CALCULATED.3IONS-SCNC: 9 MMOL/L (ref 9–17)
AST SERPL-CCNC: 21 U/L
BACTERIA URNS QL MICRO: ABNORMAL
BANDS: 3 % (ref 0–10)
BASOPHILS # BLD: 0 K/UL (ref 0–0.2)
BASOPHILS NFR BLD: 0 % (ref 0–2)
BILIRUB SERPL-MCNC: 0.5 MG/DL (ref 0.3–1.2)
BILIRUB UR QL STRIP: NEGATIVE
BUN SERPL-MCNC: 7 MG/DL (ref 6–20)
CALCIUM SERPL-MCNC: 8.7 MG/DL (ref 8.6–10.4)
CASTS #/AREA URNS LPF: ABNORMAL /LPF
CHLORIDE SERPL-SCNC: 105 MMOL/L (ref 98–107)
CLARITY UR: CLEAR
CO2 SERPL-SCNC: 24 MMOL/L (ref 20–31)
COLOR UR: YELLOW
CREAT SERPL-MCNC: 0.7 MG/DL (ref 0.7–1.2)
EOSINOPHIL # BLD: 0.46 K/UL (ref 0–0.4)
EOSINOPHILS RELATIVE PERCENT: 3 % (ref 0–4)
EPI CELLS #/AREA URNS HPF: ABNORMAL /HPF
ERYTHROCYTE [DISTWIDTH] IN BLOOD BY AUTOMATED COUNT: 12.9 % (ref 11.5–14.9)
GFR SERPL CREATININE-BSD FRML MDRD: >60 ML/MIN/1.73M2
GLUCOSE SERPL-MCNC: 101 MG/DL (ref 70–99)
GLUCOSE UR STRIP-MCNC: NEGATIVE MG/DL
HCT VFR BLD AUTO: 46 % (ref 41–53)
HGB BLD-MCNC: 15.4 G/DL (ref 13.5–17.5)
HGB UR QL STRIP.AUTO: NEGATIVE
KETONES UR STRIP-MCNC: NEGATIVE MG/DL
LEUKOCYTE ESTERASE UR QL STRIP: NEGATIVE
LIPASE SERPL-CCNC: 14 U/L (ref 13–60)
LYMPHOCYTES NFR BLD: 1.68 K/UL (ref 1–4.8)
LYMPHOCYTES RELATIVE PERCENT: 11 % (ref 24–44)
MCH RBC QN AUTO: 29.8 PG (ref 26–34)
MCHC RBC AUTO-ENTMCNC: 33.4 G/DL (ref 31–37)
MCV RBC AUTO: 89.1 FL (ref 80–100)
MONOCYTES NFR BLD: 1.84 K/UL (ref 0.1–1.3)
MONOCYTES NFR BLD: 12 % (ref 1–7)
MORPHOLOGY: NORMAL
NEUTROPHILS NFR BLD: 71 % (ref 36–66)
NEUTS SEG NFR BLD: 10.86 K/UL (ref 1.3–9.1)
NITRITE UR QL STRIP: NEGATIVE
PH UR STRIP: 5.5 [PH] (ref 5–8)
PLATELET # BLD AUTO: 226 K/UL (ref 150–450)
PMV BLD AUTO: 8.2 FL (ref 6–12)
POTASSIUM SERPL-SCNC: 3.5 MMOL/L (ref 3.7–5.3)
PROT SERPL-MCNC: 6.6 G/DL (ref 6.4–8.3)
PROT UR STRIP-MCNC: NEGATIVE MG/DL
RBC # BLD AUTO: 5.16 M/UL (ref 4.5–5.9)
RBC #/AREA URNS HPF: ABNORMAL /HPF
SODIUM SERPL-SCNC: 138 MMOL/L (ref 135–144)
SP GR UR STRIP: 1.01 (ref 1–1.03)
UROBILINOGEN UR STRIP-ACNC: NORMAL EU/DL (ref 0–1)
WBC #/AREA URNS HPF: ABNORMAL /HPF
WBC OTHER # BLD: 15.3 K/UL (ref 3.5–11)

## 2023-09-04 PROCEDURE — 6360000002 HC RX W HCPCS: Performed by: STUDENT IN AN ORGANIZED HEALTH CARE EDUCATION/TRAINING PROGRAM

## 2023-09-04 PROCEDURE — 81001 URINALYSIS AUTO W/SCOPE: CPT

## 2023-09-04 PROCEDURE — 2580000003 HC RX 258: Performed by: STUDENT IN AN ORGANIZED HEALTH CARE EDUCATION/TRAINING PROGRAM

## 2023-09-04 PROCEDURE — 6360000004 HC RX CONTRAST MEDICATION: Performed by: EMERGENCY MEDICINE

## 2023-09-04 PROCEDURE — 80053 COMPREHEN METABOLIC PANEL: CPT

## 2023-09-04 PROCEDURE — 2580000003 HC RX 258: Performed by: EMERGENCY MEDICINE

## 2023-09-04 PROCEDURE — 83690 ASSAY OF LIPASE: CPT

## 2023-09-04 PROCEDURE — 96374 THER/PROPH/DIAG INJ IV PUSH: CPT

## 2023-09-04 PROCEDURE — 36415 COLL VENOUS BLD VENIPUNCTURE: CPT

## 2023-09-04 PROCEDURE — 85025 COMPLETE CBC W/AUTO DIFF WBC: CPT

## 2023-09-04 PROCEDURE — 74177 CT ABD & PELVIS W/CONTRAST: CPT

## 2023-09-04 PROCEDURE — 99285 EMERGENCY DEPT VISIT HI MDM: CPT

## 2023-09-04 RX ORDER — SODIUM CHLORIDE, SODIUM LACTATE, POTASSIUM CHLORIDE, AND CALCIUM CHLORIDE .6; .31; .03; .02 G/100ML; G/100ML; G/100ML; G/100ML
1000 INJECTION, SOLUTION INTRAVENOUS ONCE
Status: DISCONTINUED | OUTPATIENT
Start: 2023-09-04 | End: 2023-09-04

## 2023-09-04 RX ORDER — SODIUM CHLORIDE 0.9 % (FLUSH) 0.9 %
10 SYRINGE (ML) INJECTION PRN
Status: DISCONTINUED | OUTPATIENT
Start: 2023-09-04 | End: 2023-09-04 | Stop reason: HOSPADM

## 2023-09-04 RX ORDER — 0.9 % SODIUM CHLORIDE 0.9 %
100 INTRAVENOUS SOLUTION INTRAVENOUS ONCE
Status: COMPLETED | OUTPATIENT
Start: 2023-09-04 | End: 2023-09-04

## 2023-09-04 RX ORDER — 0.9 % SODIUM CHLORIDE 0.9 %
1000 INTRAVENOUS SOLUTION INTRAVENOUS ONCE
Status: COMPLETED | OUTPATIENT
Start: 2023-09-04 | End: 2023-09-04

## 2023-09-04 RX ORDER — KETOROLAC TROMETHAMINE 30 MG/ML
30 INJECTION, SOLUTION INTRAMUSCULAR; INTRAVENOUS ONCE
Status: COMPLETED | OUTPATIENT
Start: 2023-09-04 | End: 2023-09-04

## 2023-09-04 RX ADMIN — SODIUM CHLORIDE 1000 ML: 9 INJECTION, SOLUTION INTRAVENOUS at 16:15

## 2023-09-04 RX ADMIN — SODIUM CHLORIDE 100 ML: 9 INJECTION, SOLUTION INTRAVENOUS at 16:48

## 2023-09-04 RX ADMIN — KETOROLAC TROMETHAMINE 30 MG: 30 INJECTION, SOLUTION INTRAMUSCULAR; INTRAVENOUS at 16:01

## 2023-09-04 RX ADMIN — IOPAMIDOL 75 ML: 755 INJECTION, SOLUTION INTRAVENOUS at 16:48

## 2023-09-04 RX ADMIN — SODIUM CHLORIDE, PRESERVATIVE FREE 10 ML: 5 INJECTION INTRAVENOUS at 16:48

## 2023-09-04 ASSESSMENT — PAIN SCALES - GENERAL: PAINLEVEL_OUTOF10: 8

## 2023-09-04 ASSESSMENT — LIFESTYLE VARIABLES
HOW MANY STANDARD DRINKS CONTAINING ALCOHOL DO YOU HAVE ON A TYPICAL DAY: PATIENT DOES NOT DRINK
HOW OFTEN DO YOU HAVE A DRINK CONTAINING ALCOHOL: NEVER

## 2023-09-04 ASSESSMENT — PAIN - FUNCTIONAL ASSESSMENT: PAIN_FUNCTIONAL_ASSESSMENT: 0-10

## 2023-09-04 NOTE — DISCHARGE INSTRUCTIONS
You were evaluated due to back and abdominal pain on the right side. We performed a CT that did not show any signs of any stone in your gallbladder. Your labs did not show any acute sign of obstruction of your kidney or gallbladder. We advise you to pursue a low-fat diet in case this may be related to biliary colic. Please see your primary care physician for follow-up if the pain continues. Please return to the ED if develop any worsening nausea, vomiting, fever, chills, abdominal pain, back pain, blood in your urine, blood in your stool, blood in your vomit or for any other concern.

## 2023-09-05 ASSESSMENT — ENCOUNTER SYMPTOMS
ABDOMINAL PAIN: 1
BACK PAIN: 1
SHORTNESS OF BREATH: 0

## 2023-11-08 ENCOUNTER — HOSPITAL ENCOUNTER (OUTPATIENT)
Age: 28
Setting detail: SPECIMEN
Discharge: HOME | End: 2023-11-08

## 2023-11-08 DIAGNOSIS — Z00.00 ROUTINE ADULT HEALTH MAINTENANCE: ICD-10-CM

## 2023-11-08 DIAGNOSIS — Z13.1 DIABETES MELLITUS SCREENING: ICD-10-CM

## 2023-11-08 DIAGNOSIS — Z76.89 ENCOUNTER TO ESTABLISH CARE WITH NEW DOCTOR: ICD-10-CM

## 2023-11-08 DIAGNOSIS — E78.6 LOW HDL (UNDER 40): ICD-10-CM

## 2023-11-08 DIAGNOSIS — R73.9 HYPERGLYCEMIA: ICD-10-CM

## 2023-11-08 DIAGNOSIS — E66.09 CLASS 2 OBESITY DUE TO EXCESS CALORIES WITHOUT SERIOUS COMORBIDITY WITH BODY MASS INDEX (BMI) OF 35.0 TO 35.9 IN ADULT: ICD-10-CM

## 2023-11-08 DIAGNOSIS — E55.9 VITAMIN D DEFICIENCY: ICD-10-CM

## 2023-11-08 DIAGNOSIS — Z00.00 WELL ADULT EXAM: ICD-10-CM

## 2023-11-08 LAB
BASOPHILS # BLD: 0.04 K/UL (ref 0–0.2)
BASOPHILS NFR BLD: 1 % (ref 0–2)
EOSINOPHIL # BLD: 0.14 K/UL (ref 0–0.44)
EOSINOPHILS RELATIVE PERCENT: 2 % (ref 1–4)
ERYTHROCYTE [DISTWIDTH] IN BLOOD BY AUTOMATED COUNT: 12 % (ref 11.8–14.4)
EST. AVERAGE GLUCOSE BLD GHB EST-MCNC: 88 MG/DL
HBA1C MFR BLD: 4.7 % (ref 4–6)
HCT VFR BLD AUTO: 50.7 % (ref 40.7–50.3)
HGB BLD-MCNC: 16.2 G/DL (ref 13–17)
IMM GRANULOCYTES # BLD AUTO: 0.03 K/UL (ref 0–0.3)
IMM GRANULOCYTES NFR BLD: 0 %
LYMPHOCYTES NFR BLD: 2 K/UL (ref 1.1–3.7)
LYMPHOCYTES RELATIVE PERCENT: 27 % (ref 24–43)
MCH RBC QN AUTO: 29.9 PG (ref 25.2–33.5)
MCHC RBC AUTO-ENTMCNC: 32 G/DL (ref 28.4–34.8)
MCV RBC AUTO: 93.5 FL (ref 82.6–102.9)
MONOCYTES NFR BLD: 0.85 K/UL (ref 0.1–1.2)
MONOCYTES NFR BLD: 12 % (ref 3–12)
NEUTROPHILS NFR BLD: 58 % (ref 36–65)
NEUTS SEG NFR BLD: 4.36 K/UL (ref 1.5–8.1)
NRBC BLD-RTO: 0 PER 100 WBC
PLATELET # BLD AUTO: 243 K/UL (ref 138–453)
PMV BLD AUTO: 10.5 FL (ref 8.1–13.5)
RBC # BLD AUTO: 5.42 M/UL (ref 4.21–5.77)
WBC OTHER # BLD: 7.4 K/UL (ref 3.5–11.3)

## 2023-11-09 LAB
25(OH)D3 SERPL-MCNC: 15.2 NG/ML (ref 30–100)
ALBUMIN SERPL-MCNC: 4.1 G/DL (ref 3.5–5.2)
ALBUMIN/GLOB SERPL: 1.6 {RATIO} (ref 1–2.5)
ALP SERPL-CCNC: 80 U/L (ref 40–129)
ALT SERPL-CCNC: 19 U/L (ref 10–50)
ANION GAP SERPL CALCULATED.3IONS-SCNC: 11 MMOL/L (ref 9–16)
AST SERPL-CCNC: 31 U/L (ref 10–50)
BILIRUB SERPL-MCNC: 0.5 MG/DL (ref 0–1.2)
BUN SERPL-MCNC: 11 MG/DL (ref 6–20)
CALCIUM SERPL-MCNC: 9.1 MG/DL (ref 8.6–10.4)
CHLORIDE SERPL-SCNC: 104 MMOL/L (ref 98–107)
CHOLEST SERPL-MCNC: 171 MG/DL (ref 0–199)
CHOLESTEROL/HDL RATIO: 4.6
CO2 SERPL-SCNC: 23 MMOL/L (ref 20–31)
CREAT SERPL-MCNC: 0.9 MG/DL (ref 0.7–1.2)
GLUCOSE SERPL-MCNC: 79 MG/DL (ref 74–99)
HDLC SERPL-MCNC: 37 MG/DL
LDLC SERPL CALC-MCNC: 116 MG/DL (ref 0–130)
POTASSIUM SERPL-SCNC: 4.4 MMOL/L (ref 3.7–5.3)
PROT SERPL-MCNC: 6.7 G/DL (ref 6.6–8.7)
SODIUM SERPL-SCNC: 138 MMOL/L (ref 136–145)
TRIGL SERPL-MCNC: 92 MG/DL (ref 0–149)

## 2025-03-27 ENCOUNTER — INITIAL CONSULT (OUTPATIENT)
Dept: BARIATRICS/WEIGHT MGMT | Age: 30
End: 2025-03-27
Payer: COMMERCIAL

## 2025-03-27 VITALS
SYSTOLIC BLOOD PRESSURE: 132 MMHG | DIASTOLIC BLOOD PRESSURE: 81 MMHG | WEIGHT: 255 LBS | HEART RATE: 77 BPM | BODY MASS INDEX: 37.77 KG/M2 | HEIGHT: 69 IN | OXYGEN SATURATION: 94 %

## 2025-03-27 DIAGNOSIS — Z72.0 TOBACCO ABUSE: ICD-10-CM

## 2025-03-27 DIAGNOSIS — M79.2 NERVE PAIN: ICD-10-CM

## 2025-03-27 DIAGNOSIS — R10.32 LEFT GROIN PAIN: Primary | ICD-10-CM

## 2025-03-27 PROCEDURE — 99203 OFFICE O/P NEW LOW 30 MIN: CPT | Performed by: SURGERY

## 2025-03-27 NOTE — PROGRESS NOTES
Father     Diabetes Father     Asthma Brother     Other Brother     Diabetes Paternal Grandmother     Diabetes Paternal Grandfather        Social History:   Social History     Tobacco Use    Smoking status: Some Days    Smokeless tobacco: Never   Substance Use Topics    Alcohol use: Yes     Comment: occassion    Drug use: No       Current Med List:  Current Outpatient Medications   Medication Sig Dispense Refill    vitamin D (ERGOCALCIFEROL) 1.25 MG (83745 UT) CAPS capsule Take 1 capsule by mouth once a week 12 capsule 0    naproxen (NAPROSYN) 500 MG tablet Take 1 tablet by mouth 2 times daily (with meals) 60 tablet 5     No current facility-administered medications for this visit.          SOCIAL:      This patient is alone for the evaluation today.      [] HIV Risk Factors (i.e.) intravenous drug abuser; at risk sexual behavior; received blood products    [] TB Risk Factors (i.e.) Medically underserved, institutional care, foreign born, endemic area; exposure to active case    [] Hepatitis B&C Risk Factors (i.e.) Received blood transfusion prior to 1992; recreational drug use; high risk sexual behaviors; tattoos or body piercings; contact with blood or needle sticks in the workplace    Comprehension    Ability to grasp concepts and respond to questions:   [x] High   [] Medium   [] Low    Motivation    [x] Asks Questions; eager to learn   [] Needs education   [] Extreme anxiety    [] uncooperative   [] Denies need for education    English Speaking Ability    [x] Speaks English well   [x] Reads English well   [x] Understands spoken english    [x] Understands written English   [] No need for interpretive support      [] Might benefit from interpretive support   []  required for all services     REVIEW OF SYSTEMS: (Negative unless marked otherwise)     See review of Systems scanned into media    PRESENT ILLNESS:     Weight Parameters  Weight 115.7 kg (255 lb)   Height 1.753 m (5' 9\")   BMI Body mass index

## 2025-06-06 ENCOUNTER — HOSPITAL ENCOUNTER (OUTPATIENT)
Age: 30
Setting detail: SPECIMEN
Discharge: HOME | End: 2025-06-06

## 2025-06-06 DIAGNOSIS — R73.9 HYPERGLYCEMIA: ICD-10-CM

## 2025-06-06 DIAGNOSIS — Z76.89 ENCOUNTER FOR WEIGHT MANAGEMENT: ICD-10-CM

## 2025-06-06 DIAGNOSIS — Z83.3 FAMILY HISTORY OF DIABETES MELLITUS IN FATHER: ICD-10-CM

## 2025-06-06 DIAGNOSIS — E66.812 CLASS 2 OBESITY DUE TO EXCESS CALORIES WITHOUT SERIOUS COMORBIDITY WITH BODY MASS INDEX (BMI) OF 35.0 TO 35.9 IN ADULT: ICD-10-CM

## 2025-06-06 DIAGNOSIS — E66.09 CLASS 2 OBESITY DUE TO EXCESS CALORIES WITHOUT SERIOUS COMORBIDITY WITH BODY MASS INDEX (BMI) OF 35.0 TO 35.9 IN ADULT: ICD-10-CM

## 2025-06-06 DIAGNOSIS — Z00.00 WELL ADULT EXAM: ICD-10-CM

## 2025-06-06 DIAGNOSIS — Z00.00 ROUTINE ADULT HEALTH MAINTENANCE: ICD-10-CM

## 2025-06-06 DIAGNOSIS — E78.6 LOW HDL (UNDER 40): ICD-10-CM

## 2025-06-06 DIAGNOSIS — E55.9 VITAMIN D DEFICIENCY: ICD-10-CM

## 2025-06-06 LAB
25(OH)D3 SERPL-MCNC: 25.7 NG/ML (ref 30–100)
ALBUMIN SERPL-MCNC: 4.1 G/DL (ref 3.5–5.2)
ALBUMIN/GLOB SERPL: 1.6 {RATIO} (ref 1–2.5)
ALP SERPL-CCNC: 81 U/L (ref 40–129)
ALT SERPL-CCNC: 35 U/L (ref 10–50)
ANION GAP SERPL CALCULATED.3IONS-SCNC: 10 MMOL/L (ref 9–16)
AST SERPL-CCNC: 31 U/L (ref 10–50)
BASOPHILS # BLD: 0.05 K/UL (ref 0–0.2)
BASOPHILS NFR BLD: 1 % (ref 0–2)
BILIRUB SERPL-MCNC: 0.6 MG/DL (ref 0–1.2)
BUN SERPL-MCNC: 11 MG/DL (ref 6–20)
CALCIUM SERPL-MCNC: 9.2 MG/DL (ref 8.6–10.4)
CHLORIDE SERPL-SCNC: 106 MMOL/L (ref 98–107)
CHOLEST SERPL-MCNC: 186 MG/DL (ref 0–199)
CHOLESTEROL/HDL RATIO: 4.7
CO2 SERPL-SCNC: 24 MMOL/L (ref 20–31)
CREAT SERPL-MCNC: 0.9 MG/DL (ref 0.7–1.2)
EOSINOPHIL # BLD: 0.19 K/UL (ref 0–0.44)
EOSINOPHILS RELATIVE PERCENT: 2 % (ref 1–4)
ERYTHROCYTE [DISTWIDTH] IN BLOOD BY AUTOMATED COUNT: 11.9 % (ref 11.8–14.4)
EST. AVERAGE GLUCOSE BLD GHB EST-MCNC: 97 MG/DL
GFR, ESTIMATED: >90 ML/MIN/1.73M2
GLUCOSE SERPL-MCNC: 93 MG/DL (ref 74–99)
HBA1C MFR BLD: 5 % (ref 4–6)
HCT VFR BLD AUTO: 48.9 % (ref 40.7–50.3)
HDLC SERPL-MCNC: 40 MG/DL
HGB BLD-MCNC: 16 G/DL (ref 13–17)
IMM GRANULOCYTES # BLD AUTO: 0.06 K/UL (ref 0–0.3)
IMM GRANULOCYTES NFR BLD: 1 %
LDLC SERPL CALC-MCNC: 129 MG/DL (ref 0–100)
LYMPHOCYTES NFR BLD: 2.09 K/UL (ref 1.1–3.7)
LYMPHOCYTES RELATIVE PERCENT: 24 % (ref 24–43)
MCH RBC QN AUTO: 29.7 PG (ref 25.2–33.5)
MCHC RBC AUTO-ENTMCNC: 32.7 G/DL (ref 28.4–34.8)
MCV RBC AUTO: 90.9 FL (ref 82.6–102.9)
MONOCYTES NFR BLD: 0.91 K/UL (ref 0.1–1.2)
MONOCYTES NFR BLD: 10 % (ref 3–12)
NEUTROPHILS NFR BLD: 62 % (ref 36–65)
NEUTS SEG NFR BLD: 5.49 K/UL (ref 1.5–8.1)
NRBC BLD-RTO: 0 PER 100 WBC
PLATELET # BLD AUTO: 257 K/UL (ref 138–453)
PMV BLD AUTO: 10.2 FL (ref 8.1–13.5)
POTASSIUM SERPL-SCNC: 5 MMOL/L (ref 3.7–5.3)
PROT SERPL-MCNC: 6.7 G/DL (ref 6.6–8.7)
RBC # BLD AUTO: 5.38 M/UL (ref 4.21–5.77)
SODIUM SERPL-SCNC: 140 MMOL/L (ref 136–145)
TRIGL SERPL-MCNC: 83 MG/DL
TSH SERPL DL<=0.05 MIU/L-ACNC: 1.53 UIU/ML (ref 0.27–4.2)
VLDLC SERPL CALC-MCNC: 17 MG/DL (ref 1–30)
WBC OTHER # BLD: 8.8 K/UL (ref 3.5–11.3)